# Patient Record
Sex: MALE | Race: WHITE | Employment: FULL TIME | URBAN - METROPOLITAN AREA
[De-identification: names, ages, dates, MRNs, and addresses within clinical notes are randomized per-mention and may not be internally consistent; named-entity substitution may affect disease eponyms.]

---

## 2017-01-31 DIAGNOSIS — I10 ESSENTIAL HYPERTENSION, BENIGN: Primary | ICD-10-CM

## 2017-01-31 RX ORDER — LISINOPRIL AND HYDROCHLOROTHIAZIDE 12.5; 2 MG/1; MG/1
1 TABLET ORAL DAILY
Qty: 14 TABLET | Refills: 0 | COMMUNITY
Start: 2017-01-31 | End: 2017-02-13

## 2017-01-31 NOTE — TELEPHONE ENCOUNTER
Patient called to request a 2 week supply of his medication to get him through until his appointment with Dr. Zhu on 02/13/17.    Called med to pharmacy:  Signed Prescriptions:                        Disp   Refills    lisinopril-hydrochlorothiazide (PRINZIDE/Z*14 tab*0        Sig: Take 1 tablet by mouth daily  Authorizing Provider: MINESH ZHU  Ordering User: CATRACHO NOE    Called and informed patient    Catracho Noe CMA

## 2017-02-13 ENCOUNTER — OFFICE VISIT (OUTPATIENT)
Dept: FAMILY MEDICINE | Facility: CLINIC | Age: 47
End: 2017-02-13

## 2017-02-13 VITALS
BODY MASS INDEX: 35.28 KG/M2 | HEART RATE: 88 BPM | OXYGEN SATURATION: 98 % | SYSTOLIC BLOOD PRESSURE: 150 MMHG | WEIGHT: 242.4 LBS | TEMPERATURE: 98.7 F | DIASTOLIC BLOOD PRESSURE: 76 MMHG

## 2017-02-13 DIAGNOSIS — E66.09 NON MORBID OBESITY DUE TO EXCESS CALORIES: ICD-10-CM

## 2017-02-13 DIAGNOSIS — I10 ESSENTIAL HYPERTENSION, BENIGN: Primary | ICD-10-CM

## 2017-02-13 DIAGNOSIS — E78.00 PURE HYPERCHOLESTEROLEMIA: ICD-10-CM

## 2017-02-13 DIAGNOSIS — I10 ESSENTIAL HYPERTENSION, BENIGN: ICD-10-CM

## 2017-02-13 PROCEDURE — 36415 COLL VENOUS BLD VENIPUNCTURE: CPT | Performed by: FAMILY MEDICINE

## 2017-02-13 PROCEDURE — 80061 LIPID PANEL: CPT | Mod: 90 | Performed by: FAMILY MEDICINE

## 2017-02-13 PROCEDURE — 99213 OFFICE O/P EST LOW 20 MIN: CPT | Performed by: FAMILY MEDICINE

## 2017-02-13 PROCEDURE — 80048 BASIC METABOLIC PNL TOTAL CA: CPT | Mod: 90 | Performed by: FAMILY MEDICINE

## 2017-02-13 RX ORDER — LISINOPRIL AND HYDROCHLOROTHIAZIDE 12.5; 2 MG/1; MG/1
1 TABLET ORAL DAILY
Qty: 90 TABLET | Refills: 1 | Status: SHIPPED | OUTPATIENT
Start: 2017-02-13 | End: 2018-05-07

## 2017-02-13 NOTE — NURSING NOTE
Rohan is here for a medication recheck.     Pre-Visit Screening :  Immunizations : up to date  Colon Screening : na  Asthma Action Test/Plan : na  PHQ9/GAD7 :  Na    BP done on the right arm, with a large sized cuff.  Pulse - regular  My Chart - accepts    CLASSIFICATION OF OVERWEIGHT AND OBESITY BY BMI                         Obesity Class           BMI(kg/m2)  Underweight                                    < 18.5  Normal                                         18.5-24.9  Overweight                                     25.0-29.9  OBESITY                     I                  30.0-34.9                              II                 35.0-39.9  EXTREME OBESITY             III                >40                             Patient's  BMI Body mass index is 35.28 kg/(m^2).  http://hin.nhlbi.nih.gov/menuplanner/menu.cgi  Questioned patient about current smoking habits.  Pt. has never smoked.  Dorothy.ERNST Azar ([facility])

## 2017-02-13 NOTE — MR AVS SNAPSHOT
After Visit Summary   2/13/2017    Rohan Ventura    MRN: [id number]           Patient Information     Date Of Birth          1970        Visit Information        Provider Department      2/13/2017 5:30 PM Judy Cantu MD [facility]        Today's Diagnoses     Essential hypertension, benign    -  1    Pure hypercholesterolemia        Non morbid obesity due to excess calories        BENIGN HYPERTENSION          Care Instructions    Check two blood pressures a week-   After six are recorded, call them to me [phone]    Helpful site for healthy eating: www.GenePeeks.gov    -enjoy food but eat less  -avoid oversize portions (avoid buffets)  -make half your plate fruits and vegetables  -switch to fat-free or low fat milk  -make at least one half of your grains, whole grains  -drink water instead of sweet drinks  -compare sodium (salt) in foods like soup, bread and frozen meals and choose foods with lower numbers    Pack a lunch    Recheck blood pressure in six months (eat breakfast, skip lunch)    Take baby aspirin daily            Follow-ups after your visit        Who to contact     If you have questions or need follow up information about today's clinic visit or your schedule please contact [facility] directly at [phone].  Normal or non-critical lab and imaging results will be communicated to you by MyChart, letter or phone within 4 business days after the clinic has received the results. If you do not hear from us within 7 days, please contact the clinic through Azimahart or phone. If you have a critical or abnormal lab result, we will notify you by phone as soon as possible.  Submit refill requests through DataParenting or call your pharmacy and they will forward the refill request to us. Please allow 3 business days for your refill to be completed.          Additional Information About Your Visit        MyChart Information      Seen gives you secure access to your electronic health record. If you see a primary care provider, you can also send messages to your care team and make appointments. If you have questions, please call your primary care clinic.  If you do not have a primary care provider, please call [phone] and they will assist you.        Care EveryWhere ID     This is your Care EveryWhere ID. This could be used by other organizations to access your [place] medical records  [id number]        Your Vitals Were     Pulse Temperature Pulse Oximetry BMI (Body Mass Index)          88 98.7  F (37.1  C) (Oral) 98% 35.28 kg/m2         Blood Pressure from Last 3 Encounters:   02/13/17 150/76   02/08/16 132/80   03/11/15 158/90    Weight from Last 3 Encounters:   02/13/17 110 kg (242 lb 6.4 oz)   02/08/16 108.7 kg (239 lb 9.6 oz)   03/11/15 108.1 kg (238 lb 6.4 oz)              We Performed the Following     BASIC METABOLIC PANEL (QUEST)     Lipid Profile (QUEST)     VENOUS COLLECTION          Where to get your medicines      These medications were sent to [facility]/pharmacy #[id number] - [place] - [address]  [address] [place] [address]     Phone:  [phone]     lisinopril-hydrochlorothiazide 20-12.5 MG per tablet          Primary Care Provider Office Phone # Fax #    Judy Cantu [phone]       [facility] [address]  [facility] [address]        Thank you!     Thank you for choosing [facility]  for your care. Our goal is always to provide you with excellent care. Hearing back from our patients is one way we can continue to improve our services. Please take a few minutes to complete the written survey that you may receive in the mail after your visit with us. Thank you!             Your Updated Medication List - Protect others around you: Learn how to safely use, store and throw away your medicines at www.disposemymeds.org.           This list is accurate as of: 2/13/17  6:32 PM.  Always use your most recent med list.                   Brand Name Dispense Instructions for use    aspirin 81 MG tablet      Take by mouth daily       lisinopril-hydrochlorothiazide 20-12.5 MG per tablet    PRINZIDE/ZESTORETIC    90 tablet    Take 1 tablet by mouth daily       Multi-vitamin Tabs tablet      Take 1 tablet by mouth daily       simvastatin 20 MG tablet    ZOCOR    90 tablet    Take 1 tablet (20 mg) by mouth At Bedtime

## 2017-02-14 NOTE — PATIENT INSTRUCTIONS
Check two blood pressures a week-   After six are recorded, call them to me [phone]    Helpful site for healthy eating: www.choosemyplate.gov    -enjoy food but eat less  -avoid oversize portions (avoid buffets)  -make half your plate fruits and vegetables  -switch to fat-free or low fat milk  -make at least one half of your grains, whole grains  -drink water instead of sweet drinks  -compare sodium (salt) in foods like soup, bread and frozen meals and choose foods with lower numbers    Pack a lunch    Recheck blood pressure in six months (eat breakfast, skip lunch)    Take baby aspirin daily

## 2017-02-14 NOTE — PROGRESS NOTES
SUBJECTIVE:                                                    Rohan Ventura is a 47 year old male who presents to clinic today for the following health issues:      Hyperlipidemia Follow-Up      Rate your low fat/cholesterol diet?: fair- fast food lunches- tries to make good choices such as subway    Taking statin?  Yes, no muscle aches from statin    Other lipid medications/supplements?:  none     Hypertension Follow-up      Outpatient blood pressures are being checked      Low Salt Diet: not monitoring salt       Amount of exercise or physical activity: trying to be more active- life is scheduled- work and child's activities    Problems taking medications regularly: No    Medication side effects: none  Diet: see above    PROBLEMS TO ADD ON...    Problem list and histories reviewed & adjusted, as indicated.  Additional history: creeping weight gain- despite eating similar diet for years    Patient Active Problem List   Diagnosis     Obesity     Labyrinthitis     Essential hypertension, benign     Pure hypercholesterolemia     ACP (advance care planning)     Abnormal glucose     Health Care Home     Past Surgical History   Procedure Laterality Date     C nonspecific procedure       LUNG ABSCESS SURGERY-empyema ; dr Mendes       Social History   Substance Use Topics     Smoking status: Former Smoker     Quit date: 1994     Smokeless tobacco: Never Used     Alcohol use 0.0 oz/week     0 drink(s) per week     Family History   Problem Relation Age of Onset     C.A.D. Father       age 58     Hypertension Father      Hypertension Mother      DIABETES Mother      Lipids Mother      OSTEOPOROSIS Mother       age 80 after hip fracture     Hypertension Maternal Grandfather      GASTROINTESTINAL DISEASE Sister      Crohns     GASTROINTESTINAL DISEASE Sister      Crohns         Current Outpatient Prescriptions   Medication Sig Dispense Refill     lisinopril-hydrochlorothiazide (PRINZIDE/ZESTORETIC)  20-12.5 MG per tablet Take 1 tablet by mouth daily 90 tablet 1     simvastatin (ZOCOR) 20 MG tablet Take 1 tablet (20 mg) by mouth At Bedtime 90 tablet 1     multivitamin, therapeutic with minerals (MULTI-VITAMIN) TABS Take 1 tablet by mouth daily       aspirin 81 MG tablet Take by mouth daily         ROS:  Constitutional, HEENT, cardiovascular, pulmonary, gi and gu systems are negative, except as otherwise noted.    OBJECTIVE:                                                    /76 (BP Location: Right arm)  Pulse 88  Temp 98.7  F (37.1  C) (Oral)  Wt 110 kg (242 lb 6.4 oz)  SpO2 98%  BMI 35.28 kg/m2  Body mass index is 35.28 kg/(m^2).  Regular rate and  rhythm. S1 and S2 normal, no murmurs, clicks, gallops or rubs. No edema or JVD. Chest is clear; no wheezes or rales.      Diagnostic Test Results:  Results for orders placed or performed in visit on 02/13/17   Lipid Profile (Klick2Contact)   Result Value Ref Range    Cholesterol 151 125 - 200 mg/dL    HDL Cholesterol 34 (L) > OR = 40 mg/dL    Triglycerides 127 <150 mg/dL    LDL Cholesterol Calculated 92 <130 mg/dL (calc)    Cholesterol/HDL Ratio 4.4 < OR = 5.0 (calc)    Non HDL Cholesterol 117 mg/dL (calc)   BASIC METABOLIC PANEL (QUEST)   Result Value Ref Range    Glucose 89 65 - 99 mg/dL    Urea Nitrogen 11 7 - 25 mg/dL    Creatinine 0.99 0.60 - 1.35 mg/dL    GFR Estimate 89 > OR = 60 mL/min/1.73m2    EGFR African American 103 > OR = 60 mL/min/1.73m2    BUN/Creatinine Ratio NOT APPLICABLE 6 - 22 (calc)    Sodium 137 135 - 146 mmol/L    Potassium 4.2 3.5 - 5.3 mmol/L    Chloride 100 98 - 110 mmol/L    Carbon Dioxide 26 20 - 31 mmol/L    Calcium 9.7 8.6 - 10.3 mg/dL        ASSESSMENT/PLAN:                                                      Problem List Items Addressed This Visit     Essential hypertension, benign - Primary    Relevant Medications    lisinopril-hydrochlorothiazide (PRINZIDE/ZESTORETIC) 20-12.5 MG per tablet    Other Relevant Orders    Lipid Profile  "(QUEST) (Completed)    BASIC METABOLIC PANEL (QUEST) (Completed)    VENOUS COLLECTION (Completed)    Obesity    Pure hypercholesterolemia    Relevant Orders    Lipid Profile (QUEST) (Completed)    VENOUS COLLECTION (Completed)           BMI:   Estimated body mass index is 35.28 kg/(m^2) as calculated from the following:    Height as of 2/8/16: 1.765 m (5' 9.5\").    Weight as of this encounter: 110 kg (242 lb 6.4 oz).   Weight management plan: Discussed healthy diet and exercise guidelines and patient will follow up in 6 months in clinic to re-evaluate.      MEDICATIONS:  Continue current medications without change  FUTURE APPOINTMENTS:       - Follow-up visit in 6 months- call me with six blood pressures over the next two weeks  Work on weight loss  Regular exercise    Judy Cantu MD  [facility]  White coat hypertension- has a home cuff, will check  "

## 2017-02-17 LAB
BUN SERPL-MCNC: 11 MG/DL (ref 7–25)
BUN/CREATININE RATIO: NORMAL (CALC) (ref 6–22)
CALCIUM SERPL-MCNC: 9.7 MG/DL (ref 8.6–10.3)
CHLORIDE SERPLBLD-SCNC: 100 MMOL/L (ref 98–110)
CHOLEST SERPL-MCNC: 151 MG/DL (ref 125–200)
CHOLEST/HDLC SERPL: 4.4 (CALC)
CO2 SERPL-SCNC: 26 MMOL/L (ref 20–31)
CREAT SERPL-MCNC: 0.99 MG/DL (ref 0.6–1.35)
EGFR AFRICAN AMERICAN - QUEST: 103 ML/MIN/1.73M2
GFR SERPL CREATININE-BSD FRML MDRD: 89 ML/MIN/1.73M2
GLUCOSE - QUEST: 89 MG/DL (ref 65–99)
HDLC SERPL-MCNC: 34 MG/DL
LDLC SERPL CALC-MCNC: 92 MG/DL (CALC)
NONHDLC SERPL-MCNC: 117 MG/DL (CALC)
POTASSIUM SERPL-SCNC: 4.2 MMOL/L (ref 3.5–5.3)
SODIUM SERPL-SCNC: 137 MMOL/L (ref 135–146)
TRIGL SERPL-MCNC: 127 MG/DL

## 2017-03-06 DIAGNOSIS — E78.00 PURE HYPERCHOLESTEROLEMIA: ICD-10-CM

## 2017-03-06 RX ORDER — SIMVASTATIN 20 MG
20 TABLET ORAL AT BEDTIME
Qty: 90 TABLET | Refills: 3 | Status: SHIPPED | OUTPATIENT
Start: 2017-03-06 | End: 2018-04-04

## 2018-04-04 ENCOUNTER — TELEPHONE (OUTPATIENT)
Dept: FAMILY MEDICINE | Facility: CLINIC | Age: 48
End: 2018-04-04

## 2018-04-04 DIAGNOSIS — E78.00 PURE HYPERCHOLESTEROLEMIA: ICD-10-CM

## 2018-04-04 RX ORDER — SIMVASTATIN 20 MG
20 TABLET ORAL AT BEDTIME
Qty: 30 TABLET | Refills: 0 | COMMUNITY
Start: 2018-04-04 | End: 2020-01-08

## 2018-04-04 NOTE — TELEPHONE ENCOUNTER
Ok refill of simvastatin for one month only called into [facility]. Pt needs fasting OV for further refills.     Thanks,Skylar  [phone] (home)

## 2018-05-07 ENCOUNTER — OFFICE VISIT (OUTPATIENT)
Dept: FAMILY MEDICINE | Facility: CLINIC | Age: 48
End: 2018-05-07

## 2018-05-07 VITALS
BODY MASS INDEX: 34.93 KG/M2 | HEART RATE: 95 BPM | SYSTOLIC BLOOD PRESSURE: 138 MMHG | OXYGEN SATURATION: 97 % | TEMPERATURE: 98.4 F | DIASTOLIC BLOOD PRESSURE: 82 MMHG | WEIGHT: 240 LBS

## 2018-05-07 DIAGNOSIS — Z12.5 SCREENING FOR PROSTATE CANCER: ICD-10-CM

## 2018-05-07 DIAGNOSIS — Z12.11 SCREEN FOR COLON CANCER: ICD-10-CM

## 2018-05-07 DIAGNOSIS — I10 ESSENTIAL HYPERTENSION, BENIGN: Primary | ICD-10-CM

## 2018-05-07 DIAGNOSIS — E78.00 PURE HYPERCHOLESTEROLEMIA: ICD-10-CM

## 2018-05-07 PROCEDURE — 84153 ASSAY OF PSA TOTAL: CPT | Mod: 90 | Performed by: FAMILY MEDICINE

## 2018-05-07 PROCEDURE — 80048 BASIC METABOLIC PNL TOTAL CA: CPT | Mod: 90 | Performed by: FAMILY MEDICINE

## 2018-05-07 PROCEDURE — 36415 COLL VENOUS BLD VENIPUNCTURE: CPT | Performed by: FAMILY MEDICINE

## 2018-05-07 PROCEDURE — 80061 LIPID PANEL: CPT | Mod: 90 | Performed by: FAMILY MEDICINE

## 2018-05-07 PROCEDURE — 99213 OFFICE O/P EST LOW 20 MIN: CPT | Performed by: FAMILY MEDICINE

## 2018-05-07 RX ORDER — SIMVASTATIN 20 MG
20 TABLET ORAL AT BEDTIME
Qty: 90 TABLET | Refills: 3 | Status: CANCELLED | OUTPATIENT
Start: 2018-05-07

## 2018-05-07 NOTE — PROGRESS NOTES
SUBJECTIVE:   Rohan Ventura is a 48 year old male who presents to clinic today for the following health issues:    Hypertension Follow-up      Outpatient blood pressures are not being checked.    Low Salt Diet: no added salt      Amount of exercise or physical activity:Physical Job- carries tool belt- walking    Problems taking medications regularly: No    Medication side effects: none    Diet: low salt    Problem list and histories reviewed & adjusted, as indicated.  Additional history: as documented    Patient Active Problem List   Diagnosis     Obesity     Labyrinthitis     Essential hypertension, benign     Pure hypercholesterolemia     ACP (advance care planning)     Abnormal glucose     Health Care Home     Past Surgical History:   Procedure Laterality Date     C NONSPECIFIC PROCEDURE      LUNG ABSCESS SURGERY-empyema ; dr Mendes       Social History   Substance Use Topics     Smoking status: Former Smoker     Quit date: 1994     Smokeless tobacco: Never Used     Alcohol use 0.0 oz/week     0 drink(s) per week     Family History   Problem Relation Age of Onset     C.A.D. Father       age 58     Hypertension Father      Hypertension Mother      DIABETES Mother      Lipids Mother      OSTEOPOROSIS Mother       age 80 after hip fracture     Hypertension Maternal Grandfather      GASTROINTESTINAL DISEASE Sister      Crohns     GASTROINTESTINAL DISEASE Sister      Crohns         Current Outpatient Prescriptions   Medication Sig Dispense Refill     aspirin 81 MG tablet Take by mouth daily       lisinopril-hydrochlorothiazide (PRINZIDE/ZESTORETIC) 20-12.5 MG per tablet Take 1 tablet by mouth daily 90 tablet 1     multivitamin, therapeutic with minerals (MULTI-VITAMIN) TABS Take 1 tablet by mouth daily       simvastatin (ZOCOR) 20 MG tablet Take 1 tablet (20 mg) by mouth At Bedtime 30 tablet 0       Heart problems in family- father MI at age 58 (uncles also with heart disease)  No family history  "of cancer  Mother had type 2 diabetis    Reviewed and updated as needed this visit by clinical staff       Reviewed and updated as needed this visit by Provider         ROS:  Review of Systems   Constitution: Negative for decreased appetite, fever, malaise/fatigue, weight gain and weight loss.   HENT: Negative for congestion and hoarse voice.    Cardiovascular: Negative for chest pain, claudication, dyspnea on exertion, irregular heartbeat, orthopnea, palpitations and syncope.   Respiratory: Negative for cough and shortness of breath.    Skin: Negative for rash.   Musculoskeletal: Negative for joint pain and myalgias.       OBJECTIVE:     /82 (BP Location: Left arm, Patient Position: Sitting, Cuff Size: Adult Large)  Pulse 95  Temp 98.4  F (36.9  C) (Oral)  Wt 108.9 kg (240 lb)  SpO2 97%  BMI 34.93 kg/m2  Body mass index is 34.93 kg/(m^2).   Regular rate and  rhythm. S1 and S2 normal, no murmurs, clicks, gallops or rubs. No edema or JVD. Chest is clear; no wheezes or rales.      Diagnostic Test Results:  No results found for this or any previous visit (from the past 24 hour(s)).    ASSESSMENT/PLAN:     Problem List Items Addressed This Visit     Essential hypertension, benign - Primary    Relevant Orders    Lipid Profile    BASIC METABOLIC PANEL (QUEST)    VENOUS COLLECTION (Completed)    Pure hypercholesterolemia    Relevant Orders    HCL PSA, SCREENING (QUEST)      Other Visit Diagnoses     Screen for colon cancer        Relevant Orders    GASTROENTEROLOGY ADULT REF PROCEDURE ONLY Other; [facility] [phone]    Screening for prostate cancer               BMI:   Estimated body mass index is 34.93 kg/(m^2) as calculated from the following:    Height as of 2/8/16: 1.765 m (5' 9.5\").    Weight as of this encounter: 108.9 kg (240 lb).   Weight management plan: Discussed healthy diet and exercise guidelines and patient will follow up in 6 months in clinic to re-evaluate.      MEDICATIONS:  Continue current " medications without change  FUTURE APPOINTMENTS:       - Follow-up visit - patient plans to schedule a physical  Health Care Maintenance discussed this visit  Work on weight loss  Regular exercise    Judy Cantu MD  [facility]

## 2018-05-07 NOTE — MR AVS SNAPSHOT
After Visit Summary   5/7/2018    Rohan Ventura    MRN: [id number]           Patient Information     Date Of Birth          1970        Visit Information        Provider Department      5/7/2018 5:30 PM Judy Cantu MD [facility]        Today's Diagnoses     Essential hypertension, benign    -  1    Screen for colon cancer        Pure hypercholesterolemia        Screening for prostate cancer           Follow-ups after your visit        Additional Services     GASTROENTEROLOGY ADULT REF PROCEDURE ONLY Other; [facility] [phone]       Last Lab Result: Creatinine (mg/dL)       Date                     Value                 02/13/2017               0.99             ----------  There is no height or weight on file to calculate BMI.     Needed:  No  Language:  English    Patient will be contacted to schedule procedure.     Please be aware that coverage of these services is subject to the terms and limitations of your health insurance plan.  Call member services at your health plan with any benefit or coverage questions.  Any procedures must be performed at a [facility] facility OR coordinated by your clinic's referral office.    Please bring the following with you to your appointment:    (1) Any X-Rays, CTs or MRIs which have been performed.  Contact the facility where they were done to arrange for  prior to your scheduled appointment.    (2) List of current medications   (3) This referral request   (4) Any documents/labs given to you for this referral                  Who to contact     If you have questions or need follow up information about today's clinic visit or your schedule please contact [facility] directly at [phone].  Normal or non-critical lab and imaging results will be communicated to you by MyChart, letter or phone within 4 business days after the clinic has received the results. If you do not hear from us  within 7 days, please contact the clinic through Chirply or phone. If you have a critical or abnormal lab result, we will notify you by phone as soon as possible.  Submit refill requests through Chirply or call your pharmacy and they will forward the refill request to us. Please allow 3 business days for your refill to be completed.          Additional Information About Your Visit        SportsBeephariBuildApp Information     Chirply gives you secure access to your electronic health record. If you see a primary care provider, you can also send messages to your care team and make appointments. If you have questions, please call your primary care clinic.  If you do not have a primary care provider, please call [phone] and they will assist you.        Care EveryWhere ID     This is your Care EveryWhere ID. This could be used by other organizations to access your [place] medical records  [id number]        Your Vitals Were     Pulse Temperature Pulse Oximetry BMI (Body Mass Index)          95 98.4  F (36.9  C) (Oral) 97% 34.93 kg/m2         Blood Pressure from Last 3 Encounters:   05/07/18 138/82   02/13/17 150/76   02/08/16 132/80    Weight from Last 3 Encounters:   05/07/18 108.9 kg (240 lb)   02/13/17 110 kg (242 lb 6.4 oz)   02/08/16 108.7 kg (239 lb 9.6 oz)              We Performed the Following     BASIC METABOLIC PANEL (QUEST)     GASTROENTEROLOGY ADULT REF PROCEDURE ONLY Other; [facility] [phone]     HCL PSA, SCREENING (QUEST)     Lipid Profile     VENOUS COLLECTION          Today's Medication Changes          These changes are accurate as of 5/7/18 11:59 PM.  If you have any questions, ask your nurse or doctor.               Start taking these medicines.        Dose/Directions    atorvastatin 20 MG tablet   Commonly known as:  LIPITOR   Used for:  Pure hypercholesterolemia   Started by:  Judy Cantu MD        Dose:  20 mg   Take 1 tablet (20 mg) by mouth daily   Quantity:  90 tablet   Refills:  0             Where to get your medicines      These medications were sent to [facility]/pharmacy #[id number] - [place], [place] - [address]  [address] [place] [address]     Phone:  [phone]     atorvastatin 20 MG tablet    lisinopril-hydrochlorothiazide 20-12.5 MG per tablet                Primary Care Provider Office Phone # Fax #    Judy Cantu [phone] [address] [facility] [address]  [facility] [address]        Equal Access to Services     [facility]: Hadii aad ku hadasho Soomaali, waaxda luqadaha, qaybta kaalmada adeegyada, waxay idiin hayindy adeeg khmina cooney . So [facility] [place] [phone].    ATENCIÓN: Si habla español, tiene a gaffney disposición servicios gratuitos de asistencia lingüística. [facility] [phone].    We comply with applicable federal civil rights laws and [place] laws. We do not discriminate on the basis of race, color, national origin, age, disability, sex, sexual orientation, or gender identity.            Thank you!     Thank you for choosing [facility]  for your care. Our goal is always to provide you with excellent care. Hearing back from our patients is one way we can continue to improve our services. Please take a few minutes to complete the written survey that you may receive in the mail after your visit with us. Thank you!             Your Updated Medication List - Protect others around you: Learn how to safely use, store and throw away your medicines at www.disposemymeds.org.          This list is accurate as of 5/7/18 11:59 PM.  Always use your most recent med list.                   Brand Name Dispense Instructions for use Diagnosis    aspirin 81 MG tablet      Take by mouth daily        atorvastatin 20 MG tablet    LIPITOR    90 tablet    Take 1 tablet (20 mg) by mouth daily    Pure hypercholesterolemia       lisinopril-hydrochlorothiazide 20-12.5 MG per tablet    PRINZIDE/ZESTORETIC    90 tablet    Take 1 tablet by mouth daily     Essential hypertension, benign       Multi-vitamin Tabs tablet      Take 1 tablet by mouth daily        simvastatin 20 MG tablet    ZOCOR    30 tablet    Take 1 tablet (20 mg) by mouth At Bedtime    Pure hypercholesterolemia

## 2018-05-07 NOTE — NURSING NOTE
Maurice is here for medication check and refill today.    Pre-visit Screening:  Immunizations:  up to date  Colonoscopy:  is due and ordered today  Mammogram: NA  Asthma Action Test/Plan:  No concerns   PHQ9:  No concerns  GAD7:  No concerns  Questioned patient about current smoking habits Pt. quit smoking some time ago.  Ok to leave detailed message on voice mail for today's visit only Yes, phone # [phone]   (cell)

## 2018-05-08 ASSESSMENT — ENCOUNTER SYMPTOMS
DYSPNEA ON EXERTION: 0
CLAUDICATION: 0
ORTHOPNEA: 0
PALPITATIONS: 0
SHORTNESS OF BREATH: 0
MYALGIAS: 0
FEVER: 0
SYNCOPE: 0
DECREASED APPETITE: 0
WEIGHT LOSS: 0
WEIGHT GAIN: 0
COUGH: 0
IRREGULAR HEARTBEAT: 0
HOARSE VOICE: 0

## 2018-05-09 LAB
ABBOTT PSA - QUEST: 0.5 NG/ML
BUN SERPL-MCNC: 14 MG/DL (ref 7–25)
BUN/CREATININE RATIO: NORMAL (CALC) (ref 6–22)
CALCIUM SERPL-MCNC: 9.4 MG/DL (ref 8.6–10.3)
CHLORIDE SERPLBLD-SCNC: 100 MMOL/L (ref 98–110)
CHOLEST SERPL-MCNC: 179 MG/DL
CHOLEST/HDLC SERPL: 4.8 (CALC)
CO2 SERPL-SCNC: 24 MMOL/L (ref 20–31)
CREAT SERPL-MCNC: 1.08 MG/DL (ref 0.7–1.33)
EGFR AFRICAN AMERICAN - QUEST: 92 ML/MIN/1.73M2
GFR SERPL CREATININE-BSD FRML MDRD: 80 ML/MIN/1.73M2
GLUCOSE - QUEST: 88 MG/DL (ref 65–99)
HDLC SERPL-MCNC: 37 MG/DL
LDLC SERPL CALC-MCNC: 119 MG/DL (CALC)
NONHDLC SERPL-MCNC: 142 MG/DL (CALC)
POTASSIUM SERPL-SCNC: 4.1 MMOL/L (ref 3.5–5.3)
SODIUM SERPL-SCNC: 136 MMOL/L (ref 135–146)
TRIGL SERPL-MCNC: 121 MG/DL

## 2018-05-10 RX ORDER — LISINOPRIL AND HYDROCHLOROTHIAZIDE 12.5; 2 MG/1; MG/1
1 TABLET ORAL DAILY
Qty: 90 TABLET | Refills: 3 | Status: SHIPPED | OUTPATIENT
Start: 2018-05-10 | End: 2019-01-18

## 2018-05-10 RX ORDER — ATORVASTATIN CALCIUM 20 MG/1
20 TABLET, FILM COATED ORAL DAILY
Qty: 90 TABLET | Refills: 0 | Status: SHIPPED | OUTPATIENT
Start: 2018-05-10 | End: 2018-08-07

## 2018-05-11 DIAGNOSIS — E78.00 PURE HYPERCHOLESTEROLEMIA: ICD-10-CM

## 2018-05-11 RX ORDER — SIMVASTATIN 20 MG
20 TABLET ORAL AT BEDTIME
Qty: 90 TABLET | Refills: 1 | OUTPATIENT
Start: 2018-05-11

## 2018-05-11 NOTE — TELEPHONE ENCOUNTER
RX changed to atorvastatin-   ?? Did he see his result note    Want to see him back in the office in three months after a trial of new statin

## 2018-05-11 NOTE — TELEPHONE ENCOUNTER
Refill request came in for   Pending Prescriptions:                       Disp   Refills    simvastatin (ZOCOR) 20 MG tablet          90 tab*1            Sig: Take 1 tablet (20 mg) by mouth At Bedtime    Patient was seen on 5/7/18 for medication check and was told to follow up in 6 months. Routing to Dr. Cantu for review and approval or denial.

## 2018-05-11 NOTE — TELEPHONE ENCOUNTER
Pharmacy was contacted and they stated they are now aware and will not send the refill request through anymore. Closing encounter due to no further action needed.

## 2018-07-27 ENCOUNTER — TRANSFERRED RECORDS (OUTPATIENT)
Dept: FAMILY MEDICINE | Facility: CLINIC | Age: 48
End: 2018-07-27

## 2018-08-07 DIAGNOSIS — E78.00 PURE HYPERCHOLESTEROLEMIA: ICD-10-CM

## 2018-08-07 RX ORDER — ATORVASTATIN CALCIUM 20 MG/1
TABLET, FILM COATED ORAL
Qty: 30 TABLET | Refills: 0 | Status: SHIPPED | OUTPATIENT
Start: 2018-08-07 | End: 2019-01-07

## 2018-08-07 NOTE — TELEPHONE ENCOUNTER
Received incoming refill request for   Pending Prescriptions:                       Disp   Refills    atorvastatin (LIPITOR) 20 MG tablet [Phar*90 tab*0            Sig: TAKE 1 TABLET BY MOUTH EVERY DAY    Patient was last seen in clinic to review cholesterol on 5/7/18 and last refill of this medication was given on 5/10/18 for a 90 day supply. Patient was told to follow up at visit in 6 months so he is up to date with office visits. Routing to Dr. Cantu for approval or denial of refill.

## 2018-08-07 NOTE — TELEPHONE ENCOUNTER
Patient is due for fasting labs (skip lunch and due after work- 8 hours fasting) or early morning appointment  Please call patient to schedule

## 2019-01-07 DIAGNOSIS — I10 ESSENTIAL HYPERTENSION, BENIGN: ICD-10-CM

## 2019-01-07 DIAGNOSIS — E78.00 PURE HYPERCHOLESTEROLEMIA: ICD-10-CM

## 2019-01-07 RX ORDER — ATORVASTATIN CALCIUM 20 MG/1
20 TABLET, FILM COATED ORAL DAILY
Qty: 20 TABLET | Refills: 0 | COMMUNITY
Start: 2019-01-07 | End: 2019-01-18

## 2019-01-07 NOTE — TELEPHONE ENCOUNTER
Patient called in and stated that he has an upcoming appointment scheduled with Dr. Cantu on 1/18/19 and is going to run out of his statin medication before then. He is requesting to get a short term supply that will last him until this appointment so that he does not have to go without taking the medication. I called in a 20 day supply of the medication so the patient has enough to get him through and called him to inform him of this.

## 2019-01-18 ENCOUNTER — OFFICE VISIT (OUTPATIENT)
Dept: FAMILY MEDICINE | Facility: CLINIC | Age: 49
End: 2019-01-18

## 2019-01-18 VITALS
TEMPERATURE: 98.1 F | SYSTOLIC BLOOD PRESSURE: 130 MMHG | WEIGHT: 240.4 LBS | HEIGHT: 70 IN | BODY MASS INDEX: 34.41 KG/M2 | DIASTOLIC BLOOD PRESSURE: 86 MMHG | HEART RATE: 111 BPM | OXYGEN SATURATION: 97 %

## 2019-01-18 DIAGNOSIS — L82.1 SEBORRHEIC KERATOSIS: ICD-10-CM

## 2019-01-18 DIAGNOSIS — I10 ESSENTIAL HYPERTENSION, BENIGN: ICD-10-CM

## 2019-01-18 DIAGNOSIS — Z23 NEED FOR VACCINATION: ICD-10-CM

## 2019-01-18 DIAGNOSIS — E66.09 CLASS 1 OBESITY DUE TO EXCESS CALORIES WITHOUT SERIOUS COMORBIDITY WITH BODY MASS INDEX (BMI) OF 34.0 TO 34.9 IN ADULT: ICD-10-CM

## 2019-01-18 DIAGNOSIS — E66.811 CLASS 1 OBESITY DUE TO EXCESS CALORIES WITHOUT SERIOUS COMORBIDITY WITH BODY MASS INDEX (BMI) OF 34.0 TO 34.9 IN ADULT: ICD-10-CM

## 2019-01-18 DIAGNOSIS — E78.00 PURE HYPERCHOLESTEROLEMIA: ICD-10-CM

## 2019-01-18 PROCEDURE — 99214 OFFICE O/P EST MOD 30 MIN: CPT | Mod: 25 | Performed by: FAMILY MEDICINE

## 2019-01-18 PROCEDURE — 90472 IMMUNIZATION ADMIN EACH ADD: CPT | Performed by: FAMILY MEDICINE

## 2019-01-18 PROCEDURE — 90471 IMMUNIZATION ADMIN: CPT | Performed by: FAMILY MEDICINE

## 2019-01-18 PROCEDURE — 90632 HEPA VACCINE ADULT IM: CPT | Performed by: FAMILY MEDICINE

## 2019-01-18 PROCEDURE — 90686 IIV4 VACC NO PRSV 0.5 ML IM: CPT | Performed by: FAMILY MEDICINE

## 2019-01-18 PROCEDURE — 36415 COLL VENOUS BLD VENIPUNCTURE: CPT | Performed by: FAMILY MEDICINE

## 2019-01-18 RX ORDER — LISINOPRIL AND HYDROCHLOROTHIAZIDE 12.5; 2 MG/1; MG/1
1 TABLET ORAL DAILY
Qty: 90 TABLET | Refills: 3 | Status: SHIPPED | OUTPATIENT
Start: 2019-01-18 | End: 2020-05-06

## 2019-01-18 RX ORDER — ATORVASTATIN CALCIUM 20 MG/1
20 TABLET, FILM COATED ORAL DAILY
Qty: 90 TABLET | Refills: 3 | Status: SHIPPED | OUTPATIENT
Start: 2019-01-18 | End: 2020-05-06

## 2019-01-18 ASSESSMENT — MIFFLIN-ST. JEOR: SCORE: 1958.29

## 2019-01-18 NOTE — NURSING NOTE
Rohan is here for med check fasting    Pre-visit Screening:  Immunizations:  up to date  Colonoscopy:  is up to date  Mammogram: NA  Asthma Action Test/Plan:  NA  PHQ9:  None  GAD7:  None  Questioned patient about current smoking habits Pt. quit smoking some time ago.  Ok to leave detailed message on voice mail for today's visit only Yes, phone # [phone]

## 2019-01-18 NOTE — PROGRESS NOTES
SUBJECTIVE:   Rohan Ventura is a 48 year old male who presents to clinic today for the following health issues:    Hyperlipidemia Follow-Up      Rate your low fat/cholesterol diet?: good- avoids fast food- processed food    Taking statin?  Yes, no muscle aches from statin    Pain in the left side of neck- no radicular pain    Other lipid medications/supplements?:  none    Other concerns: wants a mole checked on his  right posterior shoulder- this is a seborhea keratosis  Multiple moles- on his back    In -   Not sure about what immunizations he got at the time  Going [place] an all inclusive resort in [place] next week, Hep A advised    Hypertension Follow-up      Outpatient blood pressures are not being checked.    Low Salt Diet: no added salt      Amount of exercise or physical activity: no exercise outside of work    Problems taking medications regularly: No    Medication side effects: his wife thinks that he is more irritable- wondering if that could be a side effect of his blood pressure medications       Problem list and histories reviewed & adjusted, as indicated.  Additional history: as documented    Patient Active Problem List   Diagnosis     Obesity     Labyrinthitis     Essential hypertension, benign     Pure hypercholesterolemia     ACP (advance care planning)     Abnormal glucose     Health Care Home     Past Surgical History:   Procedure Laterality Date     C NONSPECIFIC PROCEDURE      LUNG ABSCESS SURGERY-empyema ; dr Mendes       Social History     Tobacco Use     Smoking status: Former Smoker     Last attempt to quit: 1994     Years since quittin.0     Smokeless tobacco: Never Used   Substance Use Topics     Alcohol use: Yes     Alcohol/week: 0.0 oz     Family History   Problem Relation Age of Onset     C.A.D. Father          age 58     Hypertension Father      Hypertension Mother      Diabetes Mother      Lipids Mother      Osteoporosis Mother          age 80 after  "hip fracture     Hypertension Maternal Grandfather      Gastrointestinal Disease Sister         Crohns     Gastrointestinal Disease Sister         Crohns         Current Outpatient Medications   Medication Sig Dispense Refill     aspirin 81 MG tablet Take by mouth daily       atorvastatin (LIPITOR) 20 MG tablet Take 1 tablet (20 mg) by mouth daily 20 tablet 0     lisinopril-hydrochlorothiazide (PRINZIDE/ZESTORETIC) 20-12.5 MG per tablet Take 1 tablet by mouth daily 90 tablet 3     multivitamin, therapeutic with minerals (MULTI-VITAMIN) TABS Take 1 tablet by mouth daily       simvastatin (ZOCOR) 20 MG tablet Take 1 tablet (20 mg) by mouth At Bedtime 30 tablet 0       Reviewed and updated as needed this visit by clinical staff       Reviewed and updated as needed this visit by Provider         ROS:  Constitutional, HEENT, cardiovascular, pulmonary, GI, , musculoskeletal, neuro, skin, endocrine and psych systems are negative, except as otherwise noted.    OBJECTIVE:     /86 (BP Location: Left arm, Patient Position: Sitting, Cuff Size: Adult Large)   Pulse 111   Temp 98.1  F (36.7  C) (Oral)   Ht 1.781 m (5' 10.1\")   Wt 109 kg (240 lb 6.4 oz)   SpO2 97%   BMI 34.40 kg/m    Body mass index is 34.4 kg/m .   GENERAL: healthy, alert and no distress  NECK: no adenopathy, no asymmetry, masses, or scars and thyroid normal to palpation  RESP: lungs clear to auscultation - no rales, rhonchi or wheezes  CV: regular rate and rhythm, normal S1 S2, no S3 or S4, no murmur, click or rub, no peripheral edema and peripheral pulses strong  MS: no gross musculoskeletal defects noted, no edema  Skin: seborhea keratosis on his right posterior shoulder    Diagnostic Test Results:  No results found for this or any previous visit (from the past 24 hour(s)).    ASSESSMENT/PLAN:     (Z23) Need for vaccination  (primary encounter diagnosis)  Comment:   Plan: HC FLU VAC PRESRV FREE QUAD SPLIT VIR 3+YRS IM,        VACCINE " "ADMINISTRATION, INITIAL, C HEP A VAC         ADULT 2 DOSE IM, VACCINE ADMINISTRATION, EACH         ADDITIONAL            (E78.00) Pure hypercholesterolemia  Comment: refilled at current dose for one year  Plan: atorvastatin (LIPITOR) 20 MG tablet, ALT         ([facility]), VENOUS COLLECTION, Lipid Profile            (I10) Essential hypertension, benign  Comment: continue current medication- refilled for one year  Plan: lisinopril-hydrochlorothiazide         (PRINZIDE/ZESTORETIC) 20-12.5 MG tablet, BASIC         METABOLIC PANEL ([facility]), VENOUS COLLECTION          Seborrhea Keratosis- no treatment needed       BMI:   Estimated body mass index is 34.4 kg/m  as calculated from the following:    Height as of this encounter: 1.781 m (5' 10.1\").    Weight as of this encounter: 109 kg (240 lb 6.4 oz).   Weight management plan: Discussed healthy diet and exercise guidelines      FUTURE APPOINTMENTS:       - Follow-up visit in 6 months to recheck blood pressure    Judy Cantu MD  [facility]    "

## 2019-01-19 LAB
ALT SERPL-CCNC: 28 U/L (ref 9–46)
BUN SERPL-MCNC: 17 MG/DL (ref 7–25)
BUN/CREATININE RATIO: ABNORMAL (CALC) (ref 6–22)
CALCIUM SERPL-MCNC: 9.7 MG/DL (ref 8.6–10.3)
CHLORIDE SERPLBLD-SCNC: 98 MMOL/L (ref 98–110)
CHOLEST SERPL-MCNC: 141 MG/DL
CHOLEST/HDLC SERPL: 4 (CALC)
CO2 SERPL-SCNC: 28 MMOL/L (ref 20–32)
CREAT SERPL-MCNC: 1.06 MG/DL (ref 0.7–1.33)
EGFR AFRICAN AMERICAN - QUEST: 94 ML/MIN/1.73M2
GFR SERPL CREATININE-BSD FRML MDRD: 81 ML/MIN/1.73M2
GLUCOSE - QUEST: 107 MG/DL (ref 65–99)
HDLC SERPL-MCNC: 35 MG/DL
LDLC SERPL CALC-MCNC: 81 MG/DL (CALC)
NONHDLC SERPL-MCNC: 106 MG/DL (CALC)
POTASSIUM SERPL-SCNC: 4.1 MMOL/L (ref 3.5–5.3)
SODIUM SERPL-SCNC: 136 MMOL/L (ref 135–146)
TRIGL SERPL-MCNC: 146 MG/DL

## 2019-11-27 ENCOUNTER — HOSPITAL ENCOUNTER (EMERGENCY)
Facility: CLINIC | Age: 49
Discharge: HOME | End: 2019-11-27
Attending: EMERGENCY MEDICINE | Admitting: EMERGENCY MEDICINE
Payer: COMMERCIAL

## 2019-11-27 VITALS
DIASTOLIC BLOOD PRESSURE: 91 MMHG | TEMPERATURE: 98 F | OXYGEN SATURATION: 97 % | SYSTOLIC BLOOD PRESSURE: 151 MMHG | HEART RATE: 88 BPM | RESPIRATION RATE: 15 BRPM

## 2019-11-27 DIAGNOSIS — R00.2 PALPITATIONS: ICD-10-CM

## 2019-11-27 DIAGNOSIS — I49.3 PVC'S (PREMATURE VENTRICULAR CONTRACTIONS): ICD-10-CM

## 2019-11-27 LAB
ANION GAP SERPL CALCULATED.3IONS-SCNC: 5 MMOL/L (ref 3–14)
BASOPHILS # BLD AUTO: 0.1 10E9/L (ref 0–0.2)
BASOPHILS NFR BLD AUTO: 0.4 %
BUN SERPL-MCNC: 13 MG/DL (ref 7–30)
CALCIUM SERPL-MCNC: 8.9 MG/DL (ref 8.5–10.1)
CHLORIDE SERPL-SCNC: 104 MMOL/L (ref 94–109)
CO2 SERPL-SCNC: 26 MMOL/L (ref 20–32)
CREAT SERPL-MCNC: 0.94 MG/DL (ref 0.66–1.25)
DIFFERENTIAL METHOD BLD: ABNORMAL
EOSINOPHIL # BLD AUTO: 0.1 10E9/L (ref 0–0.7)
EOSINOPHIL NFR BLD AUTO: 1 %
ERYTHROCYTE [DISTWIDTH] IN BLOOD BY AUTOMATED COUNT: 12.7 % (ref 10–15)
GFR SERPL CREATININE-BSD FRML MDRD: >90 ML/MIN/{1.73_M2}
GLUCOSE SERPL-MCNC: 109 MG/DL (ref 70–99)
HCT VFR BLD AUTO: 41.8 % (ref 40–53)
HGB BLD-MCNC: 14.1 G/DL (ref 13.3–17.7)
IMM GRANULOCYTES # BLD: 0 10E9/L (ref 0–0.4)
IMM GRANULOCYTES NFR BLD: 0.3 %
LYMPHOCYTES # BLD AUTO: 2.4 10E9/L (ref 0.8–5.3)
LYMPHOCYTES NFR BLD AUTO: 18.8 %
MAGNESIUM SERPL-MCNC: 2 MG/DL (ref 1.6–2.3)
MCH RBC QN AUTO: 29.6 PG (ref 26.5–33)
MCHC RBC AUTO-ENTMCNC: 33.7 G/DL (ref 31.5–36.5)
MCV RBC AUTO: 88 FL (ref 78–100)
MONOCYTES # BLD AUTO: 0.7 10E9/L (ref 0–1.3)
MONOCYTES NFR BLD AUTO: 5.2 %
NEUTROPHILS # BLD AUTO: 9.3 10E9/L (ref 1.6–8.3)
NEUTROPHILS NFR BLD AUTO: 74.3 %
NRBC # BLD AUTO: 0 10*3/UL
NRBC BLD AUTO-RTO: 0 /100
PLATELET # BLD AUTO: 295 10E9/L (ref 150–450)
POTASSIUM SERPL-SCNC: 3.5 MMOL/L (ref 3.4–5.3)
RBC # BLD AUTO: 4.76 10E12/L (ref 4.4–5.9)
SODIUM SERPL-SCNC: 135 MMOL/L (ref 133–144)
TROPONIN I SERPL-MCNC: <0.015 UG/L (ref 0–0.04)
TSH SERPL DL<=0.005 MIU/L-ACNC: 1.84 MU/L (ref 0.4–4)
WBC # BLD AUTO: 12.5 10E9/L (ref 4–11)

## 2019-11-27 PROCEDURE — 99284 EMERGENCY DEPT VISIT MOD MDM: CPT

## 2019-11-27 PROCEDURE — 84443 ASSAY THYROID STIM HORMONE: CPT | Performed by: EMERGENCY MEDICINE

## 2019-11-27 PROCEDURE — 85025 COMPLETE CBC W/AUTO DIFF WBC: CPT | Performed by: EMERGENCY MEDICINE

## 2019-11-27 PROCEDURE — 80048 BASIC METABOLIC PNL TOTAL CA: CPT | Performed by: EMERGENCY MEDICINE

## 2019-11-27 PROCEDURE — 93005 ELECTROCARDIOGRAM TRACING: CPT

## 2019-11-27 PROCEDURE — 83735 ASSAY OF MAGNESIUM: CPT | Performed by: EMERGENCY MEDICINE

## 2019-11-27 PROCEDURE — 84484 ASSAY OF TROPONIN QUANT: CPT | Performed by: EMERGENCY MEDICINE

## 2019-11-27 ASSESSMENT — ENCOUNTER SYMPTOMS
RHINORRHEA: 0
VOMITING: 0
SHORTNESS OF BREATH: 0
FEVER: 0
ABDOMINAL PAIN: 0
PALPITATIONS: 1

## 2019-11-27 NOTE — ED PROVIDER NOTES
"  History     Chief Complaint:  Palpitations      HPI   Rohan Ventura is a 49 year old male who presents with ***    Allergies:  No known allergies    Medications:    atorvastatin  Lisinopril  Simvastatin     Past Medical History:    Hyperlipidemia   Hypertension   Labyrinthitis  Obesity  Panic disorder       Past Surgical History:    Lung abscess surgery     Family History:    Father: coronary artery disease, hypertension  Mother: hypertension, diabetes, hyperlipidemia, osteoporosis  Sister: Chron's disease     Social History:  The patient was accompanied to the ED by ***.  Smoking Status: former smoker   Smokeless Tobacco: Never Used  Alcohol Use: Yes  Drug Use: No  PCP: Judy Cantu  Marital Status:      Review of Systems  ***    Physical Exam     Patient Vitals for the past 24 hrs:   BP Temp Pulse Resp SpO2   11/27/19 1707 (!) 177/97 98  F (36.7  C) 96 16 98 %         Physical Exam  ***    Emergency Department Course   ECG:  ECG taken at ***, ECG read at ***  Normal sinus rhythm***  *** ECG  ***  Rate *** bpm. VA interval *** ms. QRS duration *** ms. QT/QTc *** ms. P-R-T axes *** *** ***.    Imaging:  Radiology findings were communicated with the *** who voiced understanding of the findings.    No orders to display       Laboratory:  Laboratory findings were communicated with the *** who voiced understanding of the findings.    ***  CBC: WBC ***, HGB ***, PLT ***  ***MP: *** o/w WNL (Creatinine ***)  ***    Procedures    Interventions:  ***    Emergency Department Course:  Nursing notes and vitals reviewed.    *** I performed an exam of the patient as documented above.     *** ***    *** ***    *** ***    Impression & Plan      CMS Diagnoses: {Sepsis/Septic Shock/Stemi/Stroke:399814::\" \"}    {trauma activation?:928138::\" \"}    Medical Decision Making:  Rohan Ventura is a 49 year old male who presents to the emergency department today for evaluation of ***    Critical Care time was *** " minutes for this patient excluding procedures.     Diagnosis:  No diagnosis found.    Disposition:   ***    Discharge Medications:  ***  New Prescriptions    No medications on file       Scribe Disclosure:  I, Debbi Howell, am serving as a scribe at 5:29 PM on 11/27/2019 to document services personally performed by Peng Nieves MD*** based on my observations and the provider's statements to me.     [facility] EMERGENCY DEPARTMENT

## 2019-11-27 NOTE — ED TRIAGE NOTES
Patient presents to the ED with palpitations for the past few days. Reports has felt irregularities or skipped beats when checking pulse.

## 2019-11-27 NOTE — ED AVS SNAPSHOT
[facility] Emergency Department  [address]  [facility] [address]  Phone:  [phone]  Fax:  [phone]                                    Rohan Ventura   MRN: [id number]    Department:  [facility] Emergency Department   Date of Visit:  11/27/2019           After Visit Summary Signature Page    I have received my discharge instructions, and my questions have been answered. I have discussed any challenges I see with this plan with the nurse or doctor.    ..........................................................................................................................................  Patient/Patient Representative Signature      ..........................................................................................................................................  Patient Representative Print Name and Relationship to Patient    ..................................................               ................................................  Date                                   Time    ..........................................................................................................................................  Reviewed by Signature/Title    ...................................................              ..............................................  Date                                               Time          22EPIC Rev 08/18

## 2019-11-27 NOTE — ED PROVIDER NOTES
"  History     Chief Complaint:  Palpitations    The history is provided by the patient.      Rohan Ventura is a 49 year old male who presents for evaluation of palpitations. For the last five days, the patient states he has felt a \"flutter\" in his chest a couple times each day. When he feels this flutter, he reports he can feel his heart rate skipping a beat when he checks his carotid pulse, but denies any associated chest pain, shortness of breath, or other pains. Given that he has never experienced this symptoms before and the length of time he has had them, he presented to the ED today. He currently denies any symptoms. He denies any recent cold symptoms, vomiting, or other illness. He has no recent surgeries. He does report a syncopal episode a couple of years ago after which he was hospitalized due to some abnormal EKG findings; the work up at that time was otherwise normal, and he has had no other occurrences. He denies regular alcohol use, but does endorse drinking two 36 oz of Diet Cokes per day.     Allergies:  No Known Drug Allergies    Medications:    Baby aspirin  Atorvastatin  Lisinopril-hydrochlorothiazide  Simvastatin     Past Medical History:    Hypertension  Hyperlipidemia  Panic disorder    Past Surgical History:    Lung abscess surgery    Family History:    Father: CAD ( at 58), Hypertension  Mother: Hypertension, Diabetes, Osteoporosis, hyperlipidemia  Sister: Crohn's disease    Social History:  Marital Status:   [2]  Presents to the ED with wife  Former smoker  Negative for smokeless tobacco use  Positive for alcohol use.   Negative for drug use.     Review of Systems   Constitutional: Negative for fever.   HENT: Negative for congestion and rhinorrhea.    Respiratory: Negative for shortness of breath.    Cardiovascular: Positive for palpitations. Negative for chest pain.   Gastrointestinal: Negative for abdominal pain and vomiting.   All other systems reviewed and are " negative.        Physical Exam     Patient Vitals for the past 24 hrs:   BP Temp Pulse Heart Rate Resp SpO2   11/27/19 1830 (!) 130/93 -- 78 84 13 98 %   11/27/19 1815 (!) 146/82 -- 85 80 13 98 %   11/27/19 1800 (!) 148/88 -- 82 82 15 99 %   11/27/19 1745 (!) 144/88 -- 78 81 17 99 %   11/27/19 1730 (!) 156/88 -- 85 80 22 98 %   11/27/19 1715 (!) 148/93 -- 85 86 17 97 %   11/27/19 1707 (!) 177/97 98  F (36.7  C) 96 -- 16 98 %      Physical Exam  Vital signs and nursing notes reviewed.     Constitutional: laying on gurney appears comfortable  HENT: Oropharynx is clear and moist  Eyes: Conjunctivae are normal bilaterally. Pupils equal  Neck: normal range of motion  Cardiovascular: Normal rate, regular rhythm, normal heart sounds.   Pulmonary/Chest: Effort normal and breath sounds normal. No respiratory distress.   Abdominal: Soft. Bowel sounds are normal. No tenderness to palpation. No rebound or guarding.   Musculoskeletal: No joint swelling or edema.   Neurological: Alert and oriented. No focal weakness  Skin: Skin is warm and dry. No rash noted.   Psych: normal affect    Emergency Department Course   ECG:  Indication: Palpitations  Time: 1711  Vent. Rate 92 bpm. CO interval 144. QRS duration 96. QT/QTc 344/425. P-R-T axis 37 16 67.   Normal sinus rhythm.   Possible inferior infarct, age undetermined.  Abnormal ECG. Read time: 1720.    Laboratory:  CBC: WBC: 12.5 (H), HGB: 14.1, PLT: 295  BMP: Glucose 109 (H), o/w WNL (Creatinine: 0.94)  1806 Troponin: <0.015   Magnesium: 2.0   TSH: 1.84    Procedures:  None    Emergency Department Course:  Nursing notes and vitals reviewed.   1800: I performed an exam of the patient as documented above.      Patient was placed on continuous cardiac monitoring.   EKG obtained in the ED, see results above.   IV was inserted and blood was drawn for laboratory testing, results above.     1853: I rechecked the patient and discussed the results of his workup thus far.     Findings and  plan explained to the patient. Patient discharged home with instructions regarding supportive care, medications, and reasons to return. The importance of close follow-up was reviewed.     I personally reviewed the laboratory results with the patient and answered all related questions prior to discharge.    Impression & Plan      Medical Decision Making:  Rohan Ventura is a 49 year old male who presents with feelings of intermittent palpitations. He has had no syncope or specific chest pains, shortness of breath, or other complaints. His EKG is unremarkable, but while he was on continuous cardiac monitoring, it was noted he does have an occasional single PVC. Lab tests show no abnormalities. Patient was reassured that his symptomatology, as far as the symptoms he is feeling, is likely due to the PVC and this is a benign pathology. He is advised to follow-up with his PCP. He may benefit from a beta blocker if he continues to have symptomatic PVC's that are bothersome for him. Patient will follow-up with his PCP and discharged home in good condition. There is no suggestion pulmonary embolism, acute coronary syndrome, or other dangerous pathology. I felt he could be safely discharged home.     Diagnosis:    ICD-10-CM    1. Palpitations R00.2    2. PVC's (premature ventricular contractions) I49.3        Disposition:  discharged to home    Scribe Disclosure:  I, Amberly Renaldo, am serving as a scribe on 11/27/2019 at 6:52 PM to personally document services performed by Peng Nieves MD based on my observations and the provider's statements to me.      11/27/2019   [facility] EMERGENCY DEPARTMENT       Peng Nieves MD  11/28/19 8231

## 2019-11-28 LAB — INTERPRETATION ECG - MUSE: NORMAL

## 2019-11-28 NOTE — ED NOTES
Pt resting Comfortably on bed.  Alert & Oriented  Pt denies any palpitations at this time,.  EKG: NSR without ectopy.

## 2019-12-15 ENCOUNTER — HEALTH MAINTENANCE LETTER (OUTPATIENT)
Age: 49
End: 2019-12-15

## 2020-01-08 ENCOUNTER — OFFICE VISIT (OUTPATIENT)
Dept: FAMILY MEDICINE | Facility: CLINIC | Age: 50
End: 2020-01-08

## 2020-01-08 VITALS
HEIGHT: 70 IN | TEMPERATURE: 98.1 F | BODY MASS INDEX: 34.79 KG/M2 | WEIGHT: 243 LBS | DIASTOLIC BLOOD PRESSURE: 84 MMHG | SYSTOLIC BLOOD PRESSURE: 150 MMHG | OXYGEN SATURATION: 97 % | HEART RATE: 84 BPM

## 2020-01-08 DIAGNOSIS — E78.00 PURE HYPERCHOLESTEROLEMIA: ICD-10-CM

## 2020-01-08 DIAGNOSIS — R68.82 LOW LIBIDO: ICD-10-CM

## 2020-01-08 DIAGNOSIS — I10 ESSENTIAL HYPERTENSION, BENIGN: ICD-10-CM

## 2020-01-08 DIAGNOSIS — R00.2 PALPITATIONS: Primary | ICD-10-CM

## 2020-01-08 PROCEDURE — 99213 OFFICE O/P EST LOW 20 MIN: CPT | Performed by: FAMILY MEDICINE

## 2020-01-08 ASSESSMENT — MIFFLIN-ST. JEOR: SCORE: 1963.49

## 2020-01-09 NOTE — NURSING NOTE
Rohan is here to discuss ED and his recent visit to the ER.          Pre-visit Screening:  Immunizations:  up to date  Colonoscopy:  is up to date  Mammogram: NA  Asthma Action Test/Plan:  NA  PHQ9:  None  GAD7:  None  Questioned patient about current smoking habits Pt. has never smoked.  Ok to leave detailed message on voice mail for today's visit only Yes, phone # cell

## 2020-01-09 NOTE — PROGRESS NOTES
"SUBJECTIVE:  Rohan Ventura, a 49 year old male scheduled an appointment to discuss the following issues:     Palpitations  Low libido  PT was in ED for palpitations-diagnosed as PVC's- not present now.  NOtes reviewed.  He is not interested in further investigation now.    PT also wishes to discuss low libido.  He thinks this has been an issue for many years but most problematic in his relationship with his wife in last 3 years.  He is able to get and keep erections but has no desire.  His wife is worried he has a prostate or testicular issue.    PT does admit he does not sleep nearly enough and has not been exercising until recently.       Medical, social, surgical, and family histories reviewed.    ROS:  CONSTITUTIONAL: NEGATIVE for fever, chills  EYES: NEGATIVE for vision changes   RESP: NEGATIVE for significant cough or SOB  CV: NEGATIVE for chest pain, palpitations   GI: NEGATIVE for nausea, abdominal pain, heartburn, or change in bowel habits  : NEGATIVE for frequency, dysuria, or hematuria  MUSCULOSKELETAL: NEGATIVE for significant arthralgias or myalgia  NEURO: NEGATIVE for weakness, dizziness or paresthesias or headache    OBJECTIVE:  BP (!) 150/84 (BP Location: Right arm, Patient Position: Sitting, Cuff Size: Adult Large)   Pulse 84   Temp 98.1  F (36.7  C) (Oral)   Ht 1.778 m (5' 10\")   Wt 110.2 kg (243 lb)   SpO2 97%   BMI 34.87 kg/m    EXAM:  GENERAL APPEARANCE: healthy, alert and no distress  EYES: EOMI,  PERRL  HENT: ear canals and TM's normal and nose and mouth without ulcers or lesions  RESP: lungs clear to auscultation - no rales, rhonchi or wheezes  CV: regular rates and rhythm, normal S1 S2, no S3 or S4 and no murmur, click or rub -  ABDOMEN:  soft, nontender, no HSM or masses and bowel sounds normal  PSYCH: mentation appears normal and affect normal/bright    ASSESSMENT/PLAN:  (R00.2) Palpitations  (primary encounter diagnosis)  Comment: no current symptoms , discussed PVCs  Plan: " consider ziopatch if issues recur and are bothersome    patient given instructions to go to emergency department immediately if worsening of symptoms and verbalizes this understanding     (R68.82) Low libido  Comment: discussed low libido in some detail, we agree it reasonable to check testosterone but discussed his lifestyle /habits need to improve I order to help- wife wants PSA and pt plans to also do fating labs when blood is drawn  Plan: HCL PSA, SCREENING ([facility]), TESTOSTERONE TOTAL         ([facility]), VENOUS COLLECTION        Check labs, work on sleep, exercise, diet, stress, consider referral to therapist

## 2020-01-11 DIAGNOSIS — E78.00 PURE HYPERCHOLESTEROLEMIA: ICD-10-CM

## 2020-01-11 DIAGNOSIS — I10 ESSENTIAL HYPERTENSION, BENIGN: ICD-10-CM

## 2020-01-11 DIAGNOSIS — R68.82 LOW LIBIDO: ICD-10-CM

## 2020-01-11 LAB
ALBUMIN SERPL-MCNC: 4.6 G/DL (ref 3.6–5.1)
ALBUMIN/GLOB SERPL: 1.6 {RATIO} (ref 1–2.5)
ALP SERPL-CCNC: 91 U/L (ref 33–130)
ALT 1742-6: 18 U/L (ref 0–32)
AST 1920-8: 14 U/L (ref 0–35)
BILIRUB SERPL-MCNC: 1.1 MG/DL (ref 0.2–1.2)
BUN SERPL-MCNC: 12 MG/DL (ref 7–25)
BUN/CREATININE RATIO: 11.2 (ref 6–22)
CALCIUM SERPL-MCNC: 9.5 MG/DL (ref 8.6–10.3)
CHLORIDE SERPLBLD-SCNC: 102.3 MMOL/L (ref 98–110)
CHOLEST SERPL-MCNC: 141 MG/DL (ref 0–199)
CHOLEST/HDLC SERPL: 3 {RATIO} (ref 0–5)
CO2 SERPL-SCNC: 25.9 MMOL/L (ref 20–32)
CREAT SERPL-MCNC: 1.07 MG/DL (ref 0.7–1.18)
GLOBULIN, CALCULATED - QUEST: 2.9 (ref 1.9–3.7)
GLUCOSE SERPL-MCNC: 91 MG/DL (ref 60–99)
HDLC SERPL-MCNC: 44 MG/DL (ref 40–150)
LDLC SERPL CALC-MCNC: 75 MG/DL (ref 0–130)
POTASSIUM SERPL-SCNC: 4.88 MMOL/L (ref 3.5–5.3)
PROT SERPL-MCNC: 7.5 G/DL (ref 6.1–8.1)
SODIUM SERPL-SCNC: 139.6 MMOL/L (ref 135–146)
TRIGL SERPL-MCNC: 111 MG/DL (ref 0–149)

## 2020-01-11 PROCEDURE — 80053 COMPREHEN METABOLIC PANEL: CPT | Performed by: FAMILY MEDICINE

## 2020-01-11 PROCEDURE — 80061 LIPID PANEL: CPT | Performed by: FAMILY MEDICINE

## 2020-01-11 PROCEDURE — 36415 COLL VENOUS BLD VENIPUNCTURE: CPT | Performed by: FAMILY MEDICINE

## 2020-01-17 LAB
ABBOTT PSA - QUEST: 0.6 NG/ML
TESTOSTERONE, TOTAL, LC/MS/MS-QUEST: 409 NG/DL (ref 250–1100)

## 2020-05-06 ENCOUNTER — OFFICE VISIT (OUTPATIENT)
Dept: FAMILY MEDICINE | Facility: CLINIC | Age: 50
End: 2020-05-06

## 2020-05-06 VITALS
TEMPERATURE: 97.9 F | WEIGHT: 248.8 LBS | HEART RATE: 88 BPM | HEIGHT: 70 IN | DIASTOLIC BLOOD PRESSURE: 102 MMHG | BODY MASS INDEX: 35.62 KG/M2 | SYSTOLIC BLOOD PRESSURE: 162 MMHG | OXYGEN SATURATION: 98 %

## 2020-05-06 DIAGNOSIS — H60.502 ACUTE OTITIS EXTERNA OF LEFT EAR, UNSPECIFIED TYPE: Primary | ICD-10-CM

## 2020-05-06 DIAGNOSIS — E78.00 PURE HYPERCHOLESTEROLEMIA: ICD-10-CM

## 2020-05-06 DIAGNOSIS — I10 ESSENTIAL HYPERTENSION, BENIGN: ICD-10-CM

## 2020-05-06 PROCEDURE — 99213 OFFICE O/P EST LOW 20 MIN: CPT | Performed by: FAMILY MEDICINE

## 2020-05-06 RX ORDER — LISINOPRIL AND HYDROCHLOROTHIAZIDE 12.5; 2 MG/1; MG/1
1 TABLET ORAL DAILY
Qty: 90 TABLET | Refills: 1 | Status: SHIPPED | OUTPATIENT
Start: 2020-05-06 | End: 2021-01-07

## 2020-05-06 RX ORDER — NEOMYCIN SULFATE, POLYMYXIN B SULFATE AND HYDROCORTISONE 10; 3.5; 1 MG/ML; MG/ML; [USP'U]/ML
4 SUSPENSION/ DROPS AURICULAR (OTIC) 4 TIMES DAILY
Qty: 1 BOTTLE | Refills: 0 | Status: SHIPPED | OUTPATIENT
Start: 2020-05-06 | End: 2020-11-30

## 2020-05-06 RX ORDER — ATORVASTATIN CALCIUM 20 MG/1
20 TABLET, FILM COATED ORAL DAILY
Qty: 90 TABLET | Refills: 1 | Status: SHIPPED | OUTPATIENT
Start: 2020-05-06 | End: 2021-01-07

## 2020-05-06 ASSESSMENT — MIFFLIN-ST. JEOR: SCORE: 1984.8

## 2020-05-06 NOTE — NURSING NOTE
Rohan is here today for ear pain.    Pre-visit Screening:  Immunizations:  up to date  Colonoscopy:  is up to date  Mammogram: NA  Asthma Action Test/Plan:  NA  PHQ9:  NA  GAD7:  NA  Questioned patient about current smoking habits Pt. quit smoking some time ago.  Ok to leave detailed message on voice mail for today's visit only Yes, phone # [phone]

## 2020-05-06 NOTE — PROGRESS NOTES
"SUBJECTIVE:   Rohan Ventura is a 50 year old male who complains of left ear pain, fullness and pressure for 6 days. He tried wax drops 4 days ago.  Pain seemed better but still some pressure until today pain worse again.  Pain seems better when he clenches jaw at times. He denies a history of  other unusual symptoms and denies a history of asthma. Patient does not smoke cigarettes.     OBJECTIVE:BP (!) 162/102 (BP Location: Right arm, Patient Position: Sitting, Cuff Size: Adult Large)   Pulse 88   Temp 97.9  F (36.6  C) (Oral)   Ht 1.778 m (5' 10\")   Wt 112.9 kg (248 lb 12.8 oz)   SpO2 98%   BMI 35.70 kg/m     He appears well, vital signs are as noted by the nurse. EARS: - left canal reveals swelling , erythema, mattering.   Throat and pharynx normal.  Neck supple. No adenopathy in the neck. Nose is congested. Sinuses non tender. The chest is clear, without wheezes or rales.    ASSESSMENT:   Otitis externa -left    PLAN:cortisporin otic  Symptomatic therapy suggested: push fluids. Call or return to clinic prn if these symptoms worsen or fail to improve as anticipated.   "

## 2020-11-24 ENCOUNTER — TELEPHONE (OUTPATIENT)
Dept: FAMILY MEDICINE | Facility: CLINIC | Age: 50
End: 2020-11-24

## 2020-11-24 NOTE — TELEPHONE ENCOUNTER
Rohan's wife called to say that Rohan has been positive for Covid for 2 weeks.  His oxygen today was 90 and he still has a fever.  [facility] told him he wasn't sick enough to stay and they sent him home.  I told her with his oxygen levels so low and the fever, he needs to go back, but I suggested he try a different ER at [facility], or [facility] or [facility] but with those stats he needs to go back in case he has pneumonia as this can turn for the worse on a dime so I didn't want him to miss anything.  She verbalized understanding.

## 2020-11-30 ENCOUNTER — OFFICE VISIT (OUTPATIENT)
Dept: FAMILY MEDICINE | Facility: CLINIC | Age: 50
End: 2020-11-30

## 2020-11-30 VITALS
TEMPERATURE: 98.3 F | SYSTOLIC BLOOD PRESSURE: 142 MMHG | HEIGHT: 70 IN | DIASTOLIC BLOOD PRESSURE: 100 MMHG | HEART RATE: 94 BPM | BODY MASS INDEX: 32.93 KG/M2 | OXYGEN SATURATION: 96 % | WEIGHT: 230 LBS

## 2020-11-30 DIAGNOSIS — U07.1 PNEUMONIA DUE TO 2019 NOVEL CORONAVIRUS: Primary | ICD-10-CM

## 2020-11-30 DIAGNOSIS — J12.82 PNEUMONIA DUE TO 2019 NOVEL CORONAVIRUS: Primary | ICD-10-CM

## 2020-11-30 PROCEDURE — 90686 IIV4 VACC NO PRSV 0.5 ML IM: CPT | Performed by: FAMILY MEDICINE

## 2020-11-30 PROCEDURE — 90471 IMMUNIZATION ADMIN: CPT | Performed by: FAMILY MEDICINE

## 2020-11-30 PROCEDURE — 99213 OFFICE O/P EST LOW 20 MIN: CPT | Mod: 25 | Performed by: FAMILY MEDICINE

## 2020-11-30 RX ORDER — HYDROCODONE BITARTRATE AND ACETAMINOPHEN 5; 325 MG/1; MG/1
1 TABLET ORAL
COMMUNITY
Start: 2020-11-24 | End: 2021-08-18

## 2020-11-30 RX ORDER — DEXAMETHASONE 6 MG/1
TABLET ORAL
COMMUNITY
Start: 2020-11-24 | End: 2021-08-18

## 2020-11-30 ASSESSMENT — MIFFLIN-ST. JEOR: SCORE: 1899.52

## 2020-11-30 NOTE — NURSING NOTE
Rohan is here for follow up of pneumonia + Covid needs FMLA forms filled out    Pre-visit Screening:  Immunizations:  Flu shot today  Colonoscopy:  UTD  Mammogram: NA  Asthma Action Test/Plan:  NA  PHQ9:  NA  GAD7:  NA  Questioned patient about current smoking habits Pt. quit smoking some time ago.  Ok to leave detailed message on voice mail for today's visit only Yes, phone # [phone]

## 2020-11-30 NOTE — PROGRESS NOTES
"SUBJECTIVE:  Rohan Ventura, a 50 year old male scheduled an appointment to discuss the following issues:  Pneumonia due to 2019 novel coronavirus     Pt tested positive for covid 19 on 11/15, was in ED 11/20 and again 11/24 due to shortness of breath - diagnosed with covid pneumonia- treated with steroids after visit 11/24 for 5 days. Pt states he is feeling almost back to normal. He is able to exercise without shortness of breath .    Pt wants to go back to work-applied for FMLA and needs forms completed- they were supposed to be faxed here but are not here yet    Pt called out of work first on 11/13/20 when symptoms began, temporary FMLA from 11/13 through 12/4    Medical, social, surgical, and family histories reviewed.    ROS:  CONSTITUTIONAL: NEGATIVE for fever, chills  EYES: NEGATIVE for vision changes   RESP: NEGATIVE for significant cough or SOB  CV: NEGATIVE for chest pain, palpitations   GI: NEGATIVE for nausea, abdominal pain, heartburn, or change in bowel habits  : NEGATIVE for frequency, dysuria, or hematuria  MUSCULOSKELETAL: NEGATIVE for significant arthralgias or myalgia  NEURO: NEGATIVE for weakness, dizziness or paresthesias or headache    OBJECTIVE:  BP (!) 142/100 (BP Location: Left arm, Patient Position: Sitting, Cuff Size: Adult Large)   Pulse 94   Temp 98.3  F (36.8  C) (Oral)   Ht 1.778 m (5' 10\")   Wt 104.3 kg (230 lb)   SpO2 96%   BMI 33.00 kg/m    EXAM:  GENERAL APPEARANCE: healthy, alert and no distress  EYES: EOMI,  PERRL  HENT: ear canals and TM's normal and nose and mouth without ulcers or lesions  RESP: lungs clear to auscultation - no rales, rhonchi or wheezes  CV: regular rates and rhythm, normal S1 S2, no S3 or S4 and no murmur, click or rub -  ABDOMEN:  soft, nontender, no HSM or masses and bowel sounds normal    ASSESSMENT/PLAN:  (U07.1,  J12.89) Pneumonia due to 2019 novel coronavirus  (primary encounter diagnosis)  Comment: resolving clinically, will be ready to " return to work after 12/4 as planned  Plan: I will complete forms when they arrive

## 2020-12-01 ENCOUNTER — TELEPHONE (OUTPATIENT)
Dept: FAMILY MEDICINE | Facility: CLINIC | Age: 50
End: 2020-12-01

## 2021-01-07 ENCOUNTER — OFFICE VISIT (OUTPATIENT)
Dept: FAMILY MEDICINE | Facility: CLINIC | Age: 51
End: 2021-01-07

## 2021-01-07 VITALS
HEART RATE: 92 BPM | TEMPERATURE: 97.8 F | RESPIRATION RATE: 20 BRPM | HEIGHT: 70 IN | SYSTOLIC BLOOD PRESSURE: 158 MMHG | WEIGHT: 232.6 LBS | DIASTOLIC BLOOD PRESSURE: 90 MMHG | BODY MASS INDEX: 33.3 KG/M2

## 2021-01-07 DIAGNOSIS — I10 ESSENTIAL HYPERTENSION, BENIGN: ICD-10-CM

## 2021-01-07 DIAGNOSIS — U07.1 PNEUMONIA DUE TO 2019 NOVEL CORONAVIRUS: ICD-10-CM

## 2021-01-07 DIAGNOSIS — M54.50 ACUTE RIGHT-SIDED LOW BACK PAIN WITHOUT SCIATICA: Primary | ICD-10-CM

## 2021-01-07 DIAGNOSIS — E78.00 PURE HYPERCHOLESTEROLEMIA: ICD-10-CM

## 2021-01-07 DIAGNOSIS — J12.82 PNEUMONIA DUE TO 2019 NOVEL CORONAVIRUS: ICD-10-CM

## 2021-01-07 LAB
ALBUMIN (URINE): ABNORMAL MG/DL
ALBUMIN SERPL-MCNC: 4.5 G/DL (ref 3.6–5.1)
ALBUMIN/GLOB SERPL: 1.6 {RATIO} (ref 1–2.5)
ALP SERPL-CCNC: 80 U/L (ref 33–130)
ALT 1742-6: 23 U/L (ref 0–32)
APPEARANCE UR: CLEAR
AST 1920-8: 15 U/L (ref 0–35)
BACTERIA, UR: ABNORMAL
BILIRUB SERPL-MCNC: 1.2 MG/DL (ref 0.2–1.2)
BILIRUB UR QL: ABNORMAL
BUN SERPL-MCNC: 7 MG/DL (ref 7–25)
BUN/CREATININE RATIO: 6.7 (ref 6–22)
CALCIUM SERPL-MCNC: 9.8 MG/DL (ref 8.6–10.3)
CASTS/LPF: ABNORMAL
CHLORIDE SERPLBLD-SCNC: 100.4 MMOL/L (ref 98–110)
CHOLEST SERPL-MCNC: 182 MG/DL (ref 0–199)
CHOLEST/HDLC SERPL: 5 {RATIO} (ref 0–5)
CO2 SERPL-SCNC: 30.3 MMOL/L (ref 20–32)
COLOR UR: YELLOW
CREAT SERPL-MCNC: 1.05 MG/DL (ref 0.7–1.18)
EP/HPF: ABNORMAL
GLOBULIN, CALCULATED - QUEST: 2.9 (ref 1.9–3.7)
GLUCOSE SERPL-MCNC: 112 MG/DL (ref 60–99)
GLUCOSE URINE: ABNORMAL MG/DL
HDLC SERPL-MCNC: 40 MG/DL (ref 40–150)
HGB UR QL: ABNORMAL
KETONES UR QL: ABNORMAL MG/DL
LDLC SERPL CALC-MCNC: 101 MG/DL (ref 0–130)
LEUKOCYTE ESTERASE - QUEST: ABNORMAL
MISC.: ABNORMAL
NITRITE UR QL STRIP: ABNORMAL
PH UR STRIP: 7 PH (ref 5–7)
POTASSIUM SERPL-SCNC: 4.51 MMOL/L (ref 3.5–5.3)
PROT SERPL-MCNC: 7.4 G/DL (ref 6.1–8.1)
RBC, UR MICRO: ABNORMAL (ref ?–2)
SODIUM SERPL-SCNC: 139.3 MMOL/L (ref 135–146)
SP. GRAVITY: 1.02
TRIGL SERPL-MCNC: 203 MG/DL (ref 0–149)
UROBILINOGEN UR QL STRIP: 0.2 EU/DL (ref 0.2–1)
WBC, UR MICRO: ABNORMAL (ref ?–2)

## 2021-01-07 PROCEDURE — 81001 URINALYSIS AUTO W/SCOPE: CPT | Performed by: FAMILY MEDICINE

## 2021-01-07 PROCEDURE — 36415 COLL VENOUS BLD VENIPUNCTURE: CPT | Performed by: FAMILY MEDICINE

## 2021-01-07 PROCEDURE — 80053 COMPREHEN METABOLIC PANEL: CPT | Performed by: FAMILY MEDICINE

## 2021-01-07 PROCEDURE — 80061 LIPID PANEL: CPT | Performed by: FAMILY MEDICINE

## 2021-01-07 PROCEDURE — 99214 OFFICE O/P EST MOD 30 MIN: CPT | Performed by: FAMILY MEDICINE

## 2021-01-07 RX ORDER — LISINOPRIL AND HYDROCHLOROTHIAZIDE 12.5; 2 MG/1; MG/1
1 TABLET ORAL DAILY
Qty: 90 TABLET | Refills: 1 | Status: SHIPPED | OUTPATIENT
Start: 2021-01-07 | End: 2021-08-18

## 2021-01-07 RX ORDER — AMLODIPINE BESYLATE 5 MG/1
5 TABLET ORAL DAILY
Qty: 90 TABLET | Refills: 1 | Status: SHIPPED | OUTPATIENT
Start: 2021-01-07 | End: 2021-08-18

## 2021-01-07 RX ORDER — ATORVASTATIN CALCIUM 20 MG/1
20 TABLET, FILM COATED ORAL DAILY
Qty: 90 TABLET | Refills: 1 | Status: SHIPPED | OUTPATIENT
Start: 2021-01-07 | End: 2021-08-16

## 2021-01-07 ASSESSMENT — MIFFLIN-ST. JEOR: SCORE: 1907.35

## 2021-01-07 NOTE — PROGRESS NOTES
"SUBJECTIVE:   Rohan Ventura is a 50 year old male who complains of right low back pain for 2 weeks, positional with bending or lifting, without radiation down the legs. Precipitating factors: none recalled by the patient. Prior history of back problems: recurrent self limited episodes of low back pain in the past. There is no numbness in the legs.    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    Pt had covid last month- feels he has recovered    Pt taking no meds-mainly worried about kidneys given recent illness.  Pain only with movements    He denies rash    OBJECTIVE:  BP (!) 158/90 (BP Location: Right arm, Patient Position: Chair, Cuff Size: Adult Large)   Pulse 92   Temp 97.8  F (36.6  C)   Resp 20   Ht 1.772 m (5' 9.75\")   Wt 105.5 kg (232 lb 9.6 oz)   BMI 33.61 kg/m     Patient appears to be in mild pain, antalgic gait noted. Lumbosacral spine area reveals no local tenderness or mass-is mildly tender right paralumbar region, no rash.  Painful and reduced LS ROM noted. Straight leg raise is negative at 70 degrees on both sides. DTR's, motor strength and sensation normal, including heel and toe gait.  Peripheral pulses are palpable. X-Ray: not indicated.    ASSESSMENT:   lumbar strain-symptoms only with rotation to left    PLAN: Disc  For acute pain, rest, intermittent application of heat (do not sleep on heating pad) longer term treatment plan of prn NSAID's and discussed a home back care exercise program with flexion exercise routine. Proper lifting with avoidance of heavy lifting discussed. Consider Physical Therapy and XRay studies if not improving. Call or return to clinic prn if these symptoms worsen or fail to improve as anticipated.      Assessment & Plan     Back pain  As above  - HCL  Urinalysis, Routine (BFP)    Pure hypercholesterolemia  Control uncertain, needs recheck, continue current medications at current " "doses pendinglabs  - atorvastatin (LIPITOR) 20 MG tablet  Dispense: 90 tablet; Refill: 1  - Lipid Panel (BFP)  - Comprehensive Metobolic Panel (BFP)  - VENOUS COLLECTION    Essential hypertension, benign  Poorly controlled, discussed options , reviewed previous readings and home readings. Agree to add amlodipine, I reviewed the risks, benefits, and possible side effects of the medication.  The patient had an opportunity to ask any questions regarding the treatment plan. The patient was encouraged to call my office if any problems. Check blood pressure readings outside of the clinic several times per week, write down values, and follow up if elevated within the next several weeks. Blood pressure can be checked at the firestation, drugstore,  or any valid site.   - lisinopril-hydrochlorothiazide (ZESTORETIC) 20-12.5 MG tablet  Dispense: 90 tablet; Refill: 1  - amLODIPine (NORVASC) 5 MG tablet  Dispense: 90 tablet; Refill: 1  - Comprehensive Metobolic Panel (BFP)  - VENOUS COLLECTION    Pneumonia due to 2019 novel coronavirus  Resolved, do not feel kidneys damaged by covid as was his concern, renal function drawn as noted      Review of the result(s) of each unique test - labs today and UA      Reviewed hospital records from covis pneumonia, reviewed BP readings, reviewed labs, new meds as described             BMI:   Estimated body mass index is 33.61 kg/m  as calculated from the following:    Height as of this encounter: 1.772 m (5' 9.75\").    Weight as of this encounter: 105.5 kg (232 lb 9.6 oz).   Weight management plan: Discussed healthy diet and exercise guidelines      FUTURE APPOINTMENTS:       - Follow-up visit in 6 mo  Work on weight loss  Regular exercise    No follow-ups on file.    Ari Galo MD  [facility]    Subjective     Rohan Ventura is a 50 year old who presents to clinic today for the following health issues     HPI       Hyperlipidemia Follow-Up      Are you " "regularly taking any medication or supplement to lower your cholesterol?   Yes- atorvastatin    Are you having muscle aches or other side effects that you think could be caused by your cholesterol lowering medication?  No    Hypertension Follow-up      Do you check your blood pressure regularly outside of the clinic? No     Are you following a low salt diet? No    Are your blood pressures ever more than 140 on the top number (systolic) OR more   than 90 on the bottom number (diastolic), for example 140/90? Yes      How many servings of fruits and vegetables do you eat daily?  2-3    On average, how many sweetened beverages do you drink each day (Examples: soda, juice, sweet tea, etc.  Do NOT count diet or artificially sweetened beverages)?   1    How many days per week do you exercise enough to make your heart beat faster? 3 or less    How many minutes a day do you exercise enough to make your heart beat faster? 10 - 19    How many days per week do you miss taking your medication? 0        Review of Systems   Constitutional, HEENT, cardiovascular, pulmonary, gi and gu systems are negative, except as otherwise noted.      Objective    BP (!) 158/90 (BP Location: Right arm, Patient Position: Chair, Cuff Size: Adult Large)   Pulse 92   Temp 97.8  F (36.6  C)   Resp 20   Ht 1.772 m (5' 9.75\")   Wt 105.5 kg (232 lb 9.6 oz)   BMI 33.61 kg/m    Body mass index is 33.61 kg/m .  Physical Exam   GENERAL: healthy, alert and no distress  EYES: Eyes grossly normal to inspection, PERRL and conjunctivae and sclerae normal  HENT: ear canals and TM's normal, nose and mouth without ulcers or lesions  NECK: no adenopathy, no asymmetry, masses, or scars and thyroid normal to palpation  RESP: lungs clear to auscultation - no rales, rhonchi or wheezes  CV: regular rate and rhythm, normal S1 S2, no S3 or S4, no murmur, click or rub, no peripheral edema and peripheral pulses strong  ABDOMEN: soft, nontender, no hepatosplenomegaly, no " masses and bowel sounds normal  MS: no gross musculoskeletal defects noted, no edema  SKIN: no suspicious lesions or rashes  NEURO: Normal strength and tone, mentation intact and speech normal  PSYCH: mentation appears normal, affect normal/bright    Results for orders placed or performed in visit on 01/07/21   HCL  Urinalysis, Routine (BFP)     Status: Abnormal   Result Value Ref Range    Color Urine Yellow     Appearance Urine Clear     Glucose Urine Neg neg mg/dL    Bilirubin Urine Neg neg    Ketones Urine Neg neg mg/dL    Specific Gravity 1.020     Blood Urine Neg neg    pH Urine 7.0 5.0 - 7.0 pH    Albumin Urine neg neg - neg mg/dL    Urobilinogen Urine 0.2 0.2 - 1.0 EU/dL    Nitrite Urine Neg NEG    Leukocyte Esterase neg neg - neg    Wbc, Urine Micro 0-1 neg - 2    RBC Micro Urine 0-2 neg - 2    EP/HPF OCC     Bacteria Urine FEW (A) neg - neg    Casts/LPF neg     Miscellaneous neg    Lipid Panel (BFP)     Status: Abnormal   Result Value Ref Range    Cholesterol 182 0 - 199 mg/dL    Triglycerides 203 (A) 0 - 149 mg/dL    HDL Cholesterol 40 40 - 150 mg/dL    LDL Cholesterol Direct 101 0 - 130 mg/dL    Cholesterol/HDL Ratio 5 0 - 5   Comprehensive Metobolic Panel (BFP)     Status: Abnormal   Result Value Ref Range    Carbon Dioxide 30.3 20 - 32 mmol/L    Creatinine 1.05 0.70 - 1.18 mg/dL    Glucose 112 (A) 60 - 99 mg/dL    Sodium 139.3 135 - 146 mmol/L    Potassium 4.51 3.5 - 5.3 mmol/L    Chloride 100.4 98 - 110 mmol/L    Protein Total 7.4 6.1 - 8.1 g/dL    Albumin 4.5 3.6 - 5.1 g/dL    Alkaline Phosphatase 80 33 - 130 U/L    ALT 23 0 - 32 U/L    AST 15 0 - 35 U/L    Bilirubin Total 1.2 0.2 - 1.2 mg/dL    Urea Nitrogen 7 7 - 25 mg/dL    Calcium 9.8 8.6 - 10.3 mg/dL    BUN/Creatinine Ratio 6.7 6 - 22    Globulin Calculated 2.9 1.9 - 3.7    A/G Ratio 1.6 1 - 2.5

## 2021-01-15 ENCOUNTER — HEALTH MAINTENANCE LETTER (OUTPATIENT)
Age: 51
End: 2021-01-15

## 2021-08-15 DIAGNOSIS — I10 ESSENTIAL HYPERTENSION, BENIGN: ICD-10-CM

## 2021-08-16 DIAGNOSIS — E78.00 PURE HYPERCHOLESTEROLEMIA: ICD-10-CM

## 2021-08-16 RX ORDER — AMLODIPINE BESYLATE 5 MG/1
TABLET ORAL
Qty: 90 TABLET | Refills: 1 | COMMUNITY
Start: 2021-08-16

## 2021-08-16 RX ORDER — ATORVASTATIN CALCIUM 20 MG/1
TABLET, FILM COATED ORAL
Qty: 3 TABLET | Refills: 0 | COMMUNITY
Start: 2021-08-16 | End: 2021-08-18

## 2021-08-16 RX ORDER — LISINOPRIL AND HYDROCHLOROTHIAZIDE 12.5; 2 MG/1; MG/1
TABLET ORAL
Qty: 90 TABLET | Refills: 1 | COMMUNITY
Start: 2021-08-16

## 2021-08-16 NOTE — TELEPHONE ENCOUNTER
Rohan Ventura is requesting a refill of:    Signed Prescriptions:                        Disp   Refills    atorvastatin (LIPITOR) 20 MG tablet        3 tabl*0        Sig: TAKE 1 TABLET BY MOUTH EVERY DAY  Authorizing Provider: EB CARVAJAL  Ordering User: RENNY SIMPSON

## 2021-08-16 NOTE — TELEPHONE ENCOUNTER
Received incoming refill request for  Pending Prescriptions:                       Disp   Refills    lisinopril-hydrochlorothiazide (ZESTORETI*90 tab*1            Sig: TAKE 1 TABLET BY MOUTH EVERY DAY    amLODIPine (NORVASC) 5 MG tablet [Pharmac*90 tab*1            Sig: TAKE 1 TABLET BY MOUTH EVERY DAY    Patient last had a refill of these medications on 1/7/21 and has an appt 8/18, full refills will be sent in then.

## 2021-08-18 ENCOUNTER — OFFICE VISIT (OUTPATIENT)
Dept: FAMILY MEDICINE | Facility: CLINIC | Age: 51
End: 2021-08-18

## 2021-08-18 VITALS
WEIGHT: 235.4 LBS | TEMPERATURE: 98.1 F | HEART RATE: 92 BPM | SYSTOLIC BLOOD PRESSURE: 154 MMHG | HEIGHT: 70 IN | BODY MASS INDEX: 33.7 KG/M2 | DIASTOLIC BLOOD PRESSURE: 82 MMHG | RESPIRATION RATE: 20 BRPM

## 2021-08-18 DIAGNOSIS — Z71.89 ACP (ADVANCE CARE PLANNING): ICD-10-CM

## 2021-08-18 DIAGNOSIS — I10 ESSENTIAL HYPERTENSION, BENIGN: Primary | ICD-10-CM

## 2021-08-18 DIAGNOSIS — L82.0 INFLAMED SEBORRHEIC KERATOSIS: ICD-10-CM

## 2021-08-18 DIAGNOSIS — Z12.5 SPECIAL SCREENING FOR MALIGNANT NEOPLASM OF PROSTATE: ICD-10-CM

## 2021-08-18 DIAGNOSIS — E78.00 PURE HYPERCHOLESTEROLEMIA: ICD-10-CM

## 2021-08-18 DIAGNOSIS — L98.9 SKIN LESION: ICD-10-CM

## 2021-08-18 PROCEDURE — 80053 COMPREHEN METABOLIC PANEL: CPT | Performed by: FAMILY MEDICINE

## 2021-08-18 PROCEDURE — 84153 ASSAY OF PSA TOTAL: CPT | Mod: 90 | Performed by: FAMILY MEDICINE

## 2021-08-18 PROCEDURE — 99214 OFFICE O/P EST MOD 30 MIN: CPT | Performed by: FAMILY MEDICINE

## 2021-08-18 PROCEDURE — 36415 COLL VENOUS BLD VENIPUNCTURE: CPT | Performed by: FAMILY MEDICINE

## 2021-08-18 PROCEDURE — 80061 LIPID PANEL: CPT | Performed by: FAMILY MEDICINE

## 2021-08-18 RX ORDER — AMLODIPINE BESYLATE 5 MG/1
5 TABLET ORAL DAILY
Qty: 90 TABLET | Refills: 1 | Status: SHIPPED | OUTPATIENT
Start: 2021-08-18 | End: 2022-01-18

## 2021-08-18 RX ORDER — ATORVASTATIN CALCIUM 20 MG/1
20 TABLET, FILM COATED ORAL DAILY
Qty: 90 TABLET | Refills: 1 | Status: SHIPPED | OUTPATIENT
Start: 2021-08-18 | End: 2022-01-18

## 2021-08-18 RX ORDER — LISINOPRIL AND HYDROCHLOROTHIAZIDE 12.5; 2 MG/1; MG/1
1 TABLET ORAL DAILY
Qty: 90 TABLET | Refills: 1 | Status: SHIPPED | OUTPATIENT
Start: 2021-08-18 | End: 2022-01-18

## 2021-08-18 ASSESSMENT — MIFFLIN-ST. JEOR: SCORE: 1915.18

## 2021-08-18 NOTE — NURSING NOTE
Rohan Ventura is here for a medication check, mole and ankle issue.    Questioned patient about current smoking habits.  Pt. quit smoking some time ago.  PULSE regular  My Chart: active  CLASSIFICATION OF OVERWEIGHT AND OBESITY BY BMI                        Obesity Class           BMI(kg/m2)  Underweight                                    < 18.5  Normal                                         18.5-24.9  Overweight                                     25.0-29.9  OBESITY                     I                  30.0-34.9                             II                 35.0-39.9  EXTREME OBESITY             III                >40                            Patient's  BMI Body mass index is 34.01 kg/m .  http://hin.nhlbi.nih.gov/menuplanner/menu.cgi  Pre-visit planning  Immunizations - up to date  Colonoscopy - is up to date  Mammogram -   Asthma -   PHQ9 -    ALEJANDRO-7 -    Hearing Test -

## 2021-08-18 NOTE — PROGRESS NOTES
"    Assessment & Plan     Essential hypertension, benign  suboptimal control but he feels he is anxious here- agrees to start checking at home, Check blood pressure readings outside of the clinic several times per week, write down values, and follow up if elevated within the next several weeks. Blood pressure can be checked at the firestation, drugstore,  or any valid site.   - amLODIPine (NORVASC) 5 MG tablet  Dispense: 90 tablet; Refill: 1  - lisinopril-hydrochlorothiazide (ZESTORETIC) 20-12.5 MG tablet  Dispense: 90 tablet; Refill: 1    Pure hypercholesterolemia  Control uncertain, continue current medications at current doses pending labs  - atorvastatin (LIPITOR) 20 MG tablet  Dispense: 90 tablet; Refill: 1    Inflamed seborrheic keratosis  Discussed benign appearing lesion, f/u if not improving or worsening     ACP (advance care planning)      Special screening for malignant neoplasm of prostate      Skin lesion  Ankle cyst vs lipoma, f/u if worsening only    Consider derm skin survey this year      Review of external notes as documented elsewhere in note  Review of the result(s) of each unique test - labs  Ordering of each unique test  Prescription drug management         BMI:   Estimated body mass index is 34.01 kg/m  as calculated from the following:    Height as of this encounter: 1.772 m (5' 9.76\").    Weight as of this encounter: 106.8 kg (235 lb 6.4 oz).   Weight management plan: Discussed healthy diet and exercise guidelines    FUTURE APPOINTMENTS:       - Follow-up visit in 6 mo  Work on weight loss  Regular exercise    No follow-ups on file.    Ari Galo MD  [facility]    Maira Madrigal is a 51 year old who presents for the following health issues     HPI     Hyperlipidemia Follow-Up      Are you regularly taking any medication or supplement to lower your cholesterol?   Yes- atorvastatin    Are you having muscle aches or other side effects that you think could " "be caused by your cholesterol lowering medication?  No    Hypertension Follow-up      Do you check your blood pressure regularly outside of the clinic? No     Are you following a low salt diet? No    Are your blood pressures ever more than 140 on the top number (systolic) OR more   than 90 on the bottom number (diastolic), for example 140/90? No      How many servings of fruits and vegetables do you eat daily?  2-3    On average, how many sweetened beverages do you drink each day (Examples: soda, juice, sweet tea, etc.  Do NOT count diet or artificially sweetened beverages)?   1    How many days per week do you exercise enough to make your heart beat faster? 3 or less    How many minutes a day do you exercise enough to make your heart beat faster? 20 - 29    How many days per week do you miss taking your medication? 0    Pt had spot on scalp- same for 10 years, he picked at it and it is red now    Pt has lump in skin anterior left ankle for 2 years, no pain, no changes in 2 years      Review of Systems   Constitutional, HEENT, cardiovascular, pulmonary, gi and gu systems are negative, except as otherwise noted.      Objective    BP (!) 154/82 (BP Location: Right arm, Patient Position: Chair, Cuff Size: Adult Large)   Pulse 92   Temp 98.1  F (36.7  C)   Resp 20   Ht 1.772 m (5' 9.76\")   Wt 106.8 kg (235 lb 6.4 oz)   BMI 34.01 kg/m    Body mass index is 34.01 kg/m .  Physical Exam   GENERAL: healthy, alert and no distress  EYES: Eyes grossly normal to inspection, PERRL and conjunctivae and sclerae normal  HENT: ear canals and TM's normal, nose and mouth without ulcers or lesions  NECK: no adenopathy, no asymmetry, masses, or scars and thyroid normal to palpation  RESP: lungs clear to auscultation - no rales, rhonchi or wheezes  CV: regular rate and rhythm, normal S1 S2, no S3 or S4, no murmur, click or rub, no peripheral edema and peripheral pulses strong  ABDOMEN: soft, nontender, no hepatosplenomegaly, no " masses and bowel sounds normal  MS: no gross musculoskeletal defects noted, no edema  SKIN: scalp, inflamed seb K, left ankle with 1 cm cystic nodule vs lipoma, right shin insect bite  PSYCH: mentation appears normal, affect normal/bright    No results found for this or any previous visit (from the past 24 hour(s)).          '

## 2021-08-19 LAB
ALBUMIN SERPL-MCNC: 4.9 G/DL (ref 3.6–5.1)
ALBUMIN/GLOB SERPL: 1.8 {RATIO} (ref 1–2.5)
ALP SERPL-CCNC: 84 U/L (ref 33–130)
ALT 1742-6: 20 U/L (ref 0–32)
AST 1920-8: 20 U/L (ref 0–35)
BILIRUB SERPL-MCNC: 1.2 MG/DL (ref 0.2–1.2)
BUN SERPL-MCNC: 12 MG/DL (ref 7–25)
BUN/CREATININE RATIO: 9.7 (ref 6–22)
CALCIUM SERPL-MCNC: 9.6 MG/DL (ref 8.6–10.3)
CHLORIDE SERPLBLD-SCNC: 99.1 MMOL/L (ref 98–110)
CHOLEST SERPL-MCNC: 139 MG/DL (ref 0–199)
CHOLEST/HDLC SERPL: 3 {RATIO} (ref 0–5)
CO2 SERPL-SCNC: 22.3 MMOL/L (ref 20–32)
CREAT SERPL-MCNC: 1.24 MG/DL (ref 0.6–1.3)
GLOBULIN, CALCULATED - QUEST: 2.8 (ref 1.9–3.7)
GLUCOSE SERPL-MCNC: 96 MG/DL (ref 60–99)
HDLC SERPL-MCNC: 43 MG/DL (ref 40–150)
LDLC SERPL CALC-MCNC: 69 MG/DL (ref 0–130)
POTASSIUM SERPL-SCNC: 4.46 MMOL/L (ref 3.5–5.3)
PROT SERPL-MCNC: 7.7 G/DL (ref 6.1–8.1)
SODIUM SERPL-SCNC: 135.3 MMOL/L (ref 135–146)
TRIGL SERPL-MCNC: 137 MG/DL (ref 0–149)

## 2021-08-20 LAB — ABBOTT PSA - QUEST: 0.4 NG/ML

## 2021-09-04 ENCOUNTER — HEALTH MAINTENANCE LETTER (OUTPATIENT)
Age: 51
End: 2021-09-04

## 2021-11-18 ENCOUNTER — ALLIED HEALTH/NURSE VISIT (OUTPATIENT)
Dept: FAMILY MEDICINE | Facility: CLINIC | Age: 51
End: 2021-11-18

## 2021-11-18 DIAGNOSIS — Z23 NEED FOR VACCINATION: Primary | ICD-10-CM

## 2021-11-18 PROCEDURE — 90686 IIV4 VACC NO PRSV 0.5 ML IM: CPT | Performed by: PHYSICIAN ASSISTANT

## 2021-11-18 PROCEDURE — 90471 IMMUNIZATION ADMIN: CPT | Performed by: PHYSICIAN ASSISTANT

## 2022-01-18 ENCOUNTER — OFFICE VISIT (OUTPATIENT)
Dept: FAMILY MEDICINE | Facility: CLINIC | Age: 52
End: 2022-01-18

## 2022-01-18 VITALS
WEIGHT: 241 LBS | TEMPERATURE: 98.1 F | SYSTOLIC BLOOD PRESSURE: 148 MMHG | HEIGHT: 70 IN | BODY MASS INDEX: 34.5 KG/M2 | OXYGEN SATURATION: 97 % | RESPIRATION RATE: 20 BRPM | DIASTOLIC BLOOD PRESSURE: 86 MMHG | HEART RATE: 72 BPM

## 2022-01-18 DIAGNOSIS — I10 ESSENTIAL HYPERTENSION, BENIGN: ICD-10-CM

## 2022-01-18 DIAGNOSIS — E78.00 PURE HYPERCHOLESTEROLEMIA: ICD-10-CM

## 2022-01-18 DIAGNOSIS — R42 VERTIGO: Primary | ICD-10-CM

## 2022-01-18 LAB
ALBUMIN SERPL-MCNC: 4.7 G/DL (ref 3.6–5.1)
ALBUMIN/GLOB SERPL: 1.5 {RATIO} (ref 1–2.5)
ALP SERPL-CCNC: 88 U/L (ref 33–130)
ALT 1742-6: 31 U/L (ref 0–32)
AST 1920-8: 17 U/L (ref 0–35)
BILIRUB SERPL-MCNC: 1.2 MG/DL (ref 0.2–1.2)
BUN SERPL-MCNC: 11 MG/DL (ref 7–25)
BUN/CREATININE RATIO: 10.1 (ref 6–22)
CALCIUM SERPL-MCNC: 9.9 MG/DL (ref 8.6–10.3)
CHLORIDE SERPLBLD-SCNC: 100 MMOL/L (ref 98–110)
CHOLEST SERPL-MCNC: 143 MG/DL (ref 0–199)
CHOLEST/HDLC SERPL: 3 {RATIO} (ref 0–5)
CO2 SERPL-SCNC: 29.7 MMOL/L (ref 20–32)
CREAT SERPL-MCNC: 1.09 MG/DL (ref 0.6–1.3)
GLOBULIN, CALCULATED - QUEST: 3.2 (ref 1.9–3.7)
GLUCOSE SERPL-MCNC: 89 MG/DL (ref 60–99)
HDLC SERPL-MCNC: 42 MG/DL (ref 40–150)
LDLC SERPL CALC-MCNC: 73 MG/DL (ref 0–130)
POTASSIUM SERPL-SCNC: 5.17 MMOL/L (ref 3.5–5.3)
PROT SERPL-MCNC: 7.9 G/DL (ref 6.1–8.1)
SODIUM SERPL-SCNC: 138.9 MMOL/L (ref 135–146)
TRIGL SERPL-MCNC: 140 MG/DL (ref 0–149)

## 2022-01-18 PROCEDURE — 99214 OFFICE O/P EST MOD 30 MIN: CPT | Performed by: FAMILY MEDICINE

## 2022-01-18 PROCEDURE — 36415 COLL VENOUS BLD VENIPUNCTURE: CPT | Performed by: FAMILY MEDICINE

## 2022-01-18 PROCEDURE — 80053 COMPREHEN METABOLIC PANEL: CPT | Performed by: FAMILY MEDICINE

## 2022-01-18 PROCEDURE — 80061 LIPID PANEL: CPT | Performed by: FAMILY MEDICINE

## 2022-01-18 RX ORDER — ONDANSETRON 4 MG/1
4 TABLET, ORALLY DISINTEGRATING ORAL
COMMUNITY
Start: 2022-01-14 | End: 2022-04-07

## 2022-01-18 RX ORDER — ATORVASTATIN CALCIUM 20 MG/1
20 TABLET, FILM COATED ORAL DAILY
Qty: 90 TABLET | Refills: 1 | Status: SHIPPED | OUTPATIENT
Start: 2022-01-18 | End: 2022-09-08

## 2022-01-18 RX ORDER — MECLIZINE HYDROCHLORIDE 25 MG/1
25 TABLET ORAL
COMMUNITY
Start: 2022-01-14 | End: 2022-04-07

## 2022-01-18 RX ORDER — AMLODIPINE BESYLATE 5 MG/1
5 TABLET ORAL DAILY
Qty: 90 TABLET | Refills: 1 | Status: SHIPPED | OUTPATIENT
Start: 2022-01-18 | End: 2022-09-08

## 2022-01-18 RX ORDER — LISINOPRIL AND HYDROCHLOROTHIAZIDE 12.5; 2 MG/1; MG/1
1 TABLET ORAL DAILY
Qty: 90 TABLET | Refills: 1 | Status: SHIPPED | OUTPATIENT
Start: 2022-01-18 | End: 2022-09-08

## 2022-01-18 ASSESSMENT — MIFFLIN-ST. JEOR: SCORE: 1936.48

## 2022-01-18 NOTE — NURSING NOTE
Chief Complaint   Patient presents with     Hospital F/U     ER follow up, seen at [facility] ER on 1/14/22 for vertigo, doing better now, did have MRI of head and is in care everywhere to see      Recheck Medication     fasting medication check and review, did have one diet coke very early this morning but no food     Pre-visit Screening:  Immunizations:  up to date  Colonoscopy:  is up to date  Mammogram: NA  Asthma Action Test/Plan:  NA  PHQ9:  PHQ-2 done today   GAD7:  No concerns  Questioned patient about current smoking habits Pt. quit smoking some time ago.  Ok to leave detailed message on voice mail for today's visit only Yes, phone # [phone]

## 2022-01-18 NOTE — PROGRESS NOTES
Assessment & Plan     Vertigo  Resolved completely, suggests episode BPV, normal MRI and resolution of symptoms makes central cause unlikely    Essential hypertension, benign  suboptimal control, discussed options to add medication, he would like to start checking at home first    Check blood pressure readings outside of the clinic several times per week, write down values, and follow up if elevated within the next several weeks. Blood pressure can be checked at the firestation, drugstore,  or any valid site.     Pure hypercholesterolemia  Controlled based on last labs      Review of external notes as documented elsewhere in note  Review of the result(s) of each unique test - labs  Ordering of each unique test  Prescription drug management         FUTURE APPOINTMENTS:       - Follow-up visit in 6 mo  Work on weight loss  Regular exercise    No follow-ups on file.    Ari Galo MD  [facility]    Subjective   Rohan is a 51 year old who presents for the following health issues     HPI     ED/UC Followup:    Facility:  [facility] ER  Date of visit: 1/14/22, notes reviewed, had MRI  Reason for visit: Acute severe vertigo  Current Status: Doing much better, not having any dizziness anymore-only had to take the meclizine once the next day       Hyperlipidemia Follow-Up      Are you regularly taking any medication or supplement to lower your cholesterol?   Yes- atorvastatin    Are you having muscle aches or other side effects that you think could be caused by your cholesterol lowering medication?  No    Hypertension [id number]-[id number] not been checking at home, did not take meds yet today      Do you check your blood pressure regularly outside of the clinic? No     Are you following a low salt diet? No    Are your blood pressures ever more than 140 on the top number (systolic) OR more   than 90 on the bottom number (diastolic), for example 140/90? No      How many servings of fruits  and vegetables do you eat daily?  2-3    On average, how many sweetened beverages do you drink each day (Examples: soda, juice, sweet tea, etc.  Do NOT count diet or artificially sweetened beverages)?   1    How many days per week do you exercise enough to make your heart beat faster? Work related only    How many minutes a day do you exercise enough to make your heart beat faster? Much of work day    How many days per week do you miss taking your medication? 0      Review of Systems   Constitutional, HEENT, cardiovascular, pulmonary, gi and gu systems are negative, except as otherwise noted.      Objective    There were no vitals taken for this visit.  There is no height or weight on file to calculate BMI.  Physical Exam   GENERAL: healthy, alert and no distress  EYES: Eyes grossly normal to inspection, PERRL and conjunctivae and sclerae normal  HENT: ear canals and TM's normal, nose and mouth without ulcers or lesions  NECK: no adenopathy, no asymmetry, masses, or scars and thyroid normal to palpation  RESP: lungs clear to auscultation - no rales, rhonchi or wheezes  CV: regular rate and rhythm, normal S1 S2, no S3 or S4, no murmur, click or rub, no peripheral edema and peripheral pulses strong  ABDOMEN: soft, nontender, no hepatosplenomegaly, no masses and bowel sounds normal  MS: no gross musculoskeletal defects noted, no edema  NEURO: Normal strength and tone, mentation intact and speech normal  PSYCH: mentation appears normal, affect normal/bright    Office Visit on 08/18/2021   Component Date Value Ref Range Status     Cholesterol 08/19/2021 139  0 - 199 mg/dL Final     Triglycerides 08/19/2021 137  0 - 149 mg/dL Final     HDL Cholesterol 08/19/2021 43  40 - 150 mg/dL Final     LDL Cholesterol Direct 08/19/2021 69  0 - 130 mg/dL Final     Cholesterol/HDL Ratio 08/19/2021 3  0 - 5 Final     Carbon Dioxide 08/18/2021 22.3  20 - 32 mmol/L Final     Creatinine 08/18/2021 1.24  0.60 - 1.30 mg/dL Final     Glucose  08/18/2021 96  60 - 99 mg/dL Final     Sodium 08/18/2021 135.3  135 - 146 mmol/L Final     Potassium 08/18/2021 4.46  3.5 - 5.3 mmol/L Final     Chloride 08/18/2021 99.1  98 - 110 mmol/L Final     Protein Total 08/18/2021 7.7  6.1 - 8.1 g/dL Final     Albumin 08/18/2021 4.9  3.6 - 5.1 g/dL Final     Alkaline Phosphatase 08/18/2021 84  33 - 130 U/L Final     ALT 08/18/2021 20  0 - 32 U/L Final     AST 08/18/2021 20  0 - 35 U/L Final     Bilirubin Total 08/18/2021 1.2  0.2 - 1.2 mg/dL Final     Urea Nitrogen 08/18/2021 12  7 - 25 mg/dL Final     Calcium 08/18/2021 9.6  8.6 - 10.3 mg/dL Final     BUN/Creatinine Ratio 08/18/2021 9.7  6 - 22 Final     Globulin Calculated 08/18/2021 2.8  1.9 - 3.7 Final     A/G Ratio 08/18/2021 1.8  1 - 2.5 Final     Abbott PSA 08/18/2021 0.4  < OR = 4.0 ng/mL Final    Comment: The total PSA value from this assay system is   standardized against the WHO standard. The test   result will be approximately 20% lower when compared   to the equimolar-standardized total PSA ([place]). Comparison of serial PSA results should be   interpreted with this fact in mind.     This test was performed using the Siemens   chemiluminescent method. Values obtained from   different assay methods cannot be used  interchangeably. PSA levels, regardless of  value, should not be interpreted as absolute  evidence of the presence or absence of disease.

## 2022-02-19 ENCOUNTER — HEALTH MAINTENANCE LETTER (OUTPATIENT)
Age: 52
End: 2022-02-19

## 2022-04-07 ENCOUNTER — OFFICE VISIT (OUTPATIENT)
Dept: FAMILY MEDICINE | Facility: CLINIC | Age: 52
End: 2022-04-07

## 2022-04-07 VITALS
SYSTOLIC BLOOD PRESSURE: 146 MMHG | WEIGHT: 239.2 LBS | TEMPERATURE: 98.2 F | RESPIRATION RATE: 20 BRPM | BODY MASS INDEX: 34.24 KG/M2 | HEIGHT: 70 IN | DIASTOLIC BLOOD PRESSURE: 82 MMHG | HEART RATE: 80 BPM

## 2022-04-07 DIAGNOSIS — R36.1 HEMATOSPERMIA: ICD-10-CM

## 2022-04-07 DIAGNOSIS — R30.0 DYSURIA: Primary | ICD-10-CM

## 2022-04-07 LAB
APPEARANCE UR: CLEAR
BACTERIA, UR: ABNORMAL
BILIRUB UR QL: ABNORMAL
CASTS/LPF: ABNORMAL
COLOR UR: YELLOW
EP/HPF: ABNORMAL
GLUCOSE URINE: ABNORMAL MG/DL
HGB UR QL: ABNORMAL
KETONES UR QL: ABNORMAL MG/DL
MISC.: ABNORMAL
NITRITE UR QL STRIP: ABNORMAL
PH UR STRIP: 6 PH (ref 5–7)
PROT UR QL: ABNORMAL MG/DL
RBC, UR MICRO: ABNORMAL (ref ?–2)
SP GR UR STRIP: 1.01 (ref 1–1.03)
UROBILINOGEN UR QL STRIP: 0.2 EU/DL (ref 0.2–1)
WBC #/AREA URNS HPF: ABNORMAL /[HPF]
WBC, UR MICRO: ABNORMAL (ref ?–2)

## 2022-04-07 PROCEDURE — 36415 COLL VENOUS BLD VENIPUNCTURE: CPT | Performed by: FAMILY MEDICINE

## 2022-04-07 PROCEDURE — 81001 URINALYSIS AUTO W/SCOPE: CPT | Performed by: FAMILY MEDICINE

## 2022-04-07 PROCEDURE — 84153 ASSAY OF PSA TOTAL: CPT | Mod: 90 | Performed by: FAMILY MEDICINE

## 2022-04-07 PROCEDURE — 99213 OFFICE O/P EST LOW 20 MIN: CPT | Performed by: FAMILY MEDICINE

## 2022-04-07 NOTE — NURSING NOTE
"Rohan Ventura is here for \"plumbing issues\" that started today.    Questioned patient about current smoking habits.  Pt. quit smoking some time ago.  PULSE regular  My Chart: active  CLASSIFICATION OF OVERWEIGHT AND OBESITY BY BMI                        Obesity Class           BMI(kg/m2)  Underweight                                    < 18.5  Normal                                         18.5-24.9  Overweight                                     25.0-29.9  OBESITY                     I                  30.0-34.9                             II                 35.0-39.9  EXTREME OBESITY             III                >40                            Patient's  BMI Body mass index is 34.82 kg/m .  http://hin.nhlbi.nih.gov/menuplanner/menu.cgi  Pre-visit planning  Immunizations - up to date  Colonoscopy - is up to date  Mammogram -   Asthma -   PHQ9 -    ALEJANDRO-7 -      "

## 2022-04-07 NOTE — PROGRESS NOTES
SUBJECTIVE: Rohan Ventura is a 52 year old male who complains of blood in ejaculate today after am intercourse, no pain, no flank pain, no fever,no chills. NO sex toys used    Patient Active Problem List   Diagnosis     Obesity     Labyrinthitis     Essential hypertension, benign     Pure hypercholesterolemia     ACP (advance care planning)     Abnormal glucose     Health Care Home     Past Medical History:   Diagnosis Date     Panic disorder without agoraphobia      Family History   Problem Relation Age of Onset     C.A.D. Father          age 58     Hypertension Father      Hypertension Mother      Diabetes Mother      Lipids Mother      Osteoporosis Mother          age 80 after hip fracture     Hypertension Maternal Grandfather      Gastrointestinal Disease Sister         Crohns     Gastrointestinal Disease Sister         Crohns     Social History     Socioeconomic History     Marital status:      Spouse name: Not on file     Number of children: Not on file     Years of education: Not on file     Highest education level: Not on file   Occupational History     Occupation: repair   Tobacco Use     Smoking status: Former Smoker     Quit date: 1994     Years since quittin.2     Smokeless tobacco: Never Used   Substance and Sexual Activity     Alcohol use: Yes     Alcohol/week: 0.0 standard drinks     Drug use: No     Sexual activity: Yes   Other Topics Concern      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, [employer] , infantry      Blood Transfusions No     Caffeine Concern No     Occupational Exposure No     Hobby Hazards No     Sleep Concern No     Stress Concern No     Weight Concern No     Special Diet No     Back Care No     Exercise No     Bike Helmet No     Seat Belt Yes     Self-Exams Not Asked   Social History Narrative     Not on file     Social Determinants of Health     Financial Resource Strain: Not on file   Food Insecurity: Not on file   Transportation Needs: Not on  "file   Physical Activity: Not on file   Stress: Not on file   Social Connections: Not on file   Intimate Partner Violence: Not on file   Housing Stability: Not on file     Past Surgical History:   Procedure Laterality Date     [facility] NONSPECIFIC PROCEDURE  1997    LUNG ABSCESS SURGERY-empyema ; dr Mendes     amLODIPine (NORVASC) 5 MG tablet, Take 1 tablet (5 mg) by mouth daily  atorvastatin (LIPITOR) 20 MG tablet, Take 1 tablet (20 mg) by mouth daily  lisinopril-hydrochlorothiazide (ZESTORETIC) 20-12.5 MG tablet, Take 1 tablet by mouth daily  multivitamin, therapeutic with minerals (MULTI-VITAMIN) TABS, Take 1 tablet by mouth daily    No current facility-administered medications on file prior to visit.       Allergies: No known drug allergies    Immunization History   Administered Date(s) Administered     COVID-19,PF,Pfizer (12+ Yrs) 04/14/2021, 05/04/2021, 11/11/2021     HepA-Adult 01/18/2019     Influenza (IIV3) PF 09/16/2010, 09/24/2011, 09/01/2012, 09/09/2013, 09/16/2014     Influenza Vaccine IM > 6 months Valent IIV4 (Alfuria,Fluzone) 02/08/2016, 01/18/2019, 11/30/2020, 11/18/2021     Influenza Vaccine, 6+MO IM (QUADRIVALENT W/PRESERVATIVES) 10/16/2019     TD (ADULT, 7+) 04/29/2003     TDAP Vaccine (Boostrix) 09/01/2012        OBJECTIVE:BP (!) 146/82 (BP Location: Left arm, Patient Position: Chair, Cuff Size: Adult Large)   Pulse 80   Temp 98.2  F (36.8  C)   Resp 20   Ht 1.765 m (5' 9.5\")   Wt 108.5 kg (239 lb 3.2 oz)   BMI 34.82 kg/m    Appears well, in no apparent distress.  Vital signs are normal. The abdomen is soft without tenderness, guarding, mass, rebound or organomegaly. No CVA tenderness or inguinal adenopathy noted. Urine dipstick shows negative for all components.  Micro exam: negative for WBC's or RBC's.     Genitals normal; both testes normal without tenderness, masses, hydroceles, varicoceles, erythema or swelling. Shaft normal, circumcised, meatus normal without discharge. No inguinal hernia " noted. No inguinal lymphadenopathy.    ASSESSMENT:       (R36.1) Hematospermia  Comment: discussed likely benign issue, no worrisome symptoms , urine clear  Plan: we will check PSA at pt request, if symptoms resolve as expected no further valuation, urology if symptoms persist for a month

## 2022-04-08 LAB — ABBOTT PSA - QUEST: 0.47 NG/ML

## 2022-04-27 ENCOUNTER — OFFICE VISIT (OUTPATIENT)
Dept: FAMILY MEDICINE | Facility: CLINIC | Age: 52
End: 2022-04-27

## 2022-04-27 VITALS
BODY MASS INDEX: 33.76 KG/M2 | HEIGHT: 70 IN | DIASTOLIC BLOOD PRESSURE: 80 MMHG | RESPIRATION RATE: 20 BRPM | HEART RATE: 84 BPM | TEMPERATURE: 97.7 F | WEIGHT: 235.8 LBS | SYSTOLIC BLOOD PRESSURE: 142 MMHG

## 2022-04-27 DIAGNOSIS — R36.1 HEMATOSPERMIA: Primary | ICD-10-CM

## 2022-04-27 PROCEDURE — 99213 OFFICE O/P EST LOW 20 MIN: CPT | Performed by: FAMILY MEDICINE

## 2022-04-27 PROCEDURE — 87591 N.GONORRHOEAE DNA AMP PROB: CPT | Mod: 90 | Performed by: FAMILY MEDICINE

## 2022-04-27 PROCEDURE — 87491 CHLMYD TRACH DNA AMP PROBE: CPT | Mod: 90 | Performed by: FAMILY MEDICINE

## 2022-04-27 NOTE — PROGRESS NOTES
SUBJECTIVE: Rohan Ventura is a 52 year old male who presents for persistent hematospermia - states he still sees dark red color/dark brown color with every ejaculation.  This has been occurring for 3 weeks now-no pain, no dysuria, no blood or discoloration in urine.  No fevers.  No trauma. No hx prostate bx/manipulation    He was seen here 22-Ua with micro and PSA wnl    NO FH bladder, penile CA    Patient Active Problem List   Diagnosis     Obesity     Labyrinthitis     Essential hypertension, benign     Pure hypercholesterolemia     ACP (advance care planning)     Abnormal glucose     Health Care Home     Past Medical History:   Diagnosis Date     Panic disorder without agoraphobia      Family History   Problem Relation Age of Onset     C.A.D. Father          age 58     Hypertension Father      Hypertension Mother      Diabetes Mother      Lipids Mother      Osteoporosis Mother          age 80 after hip fracture     Hypertension Maternal Grandfather      Gastrointestinal Disease Sister         Crohns     Gastrointestinal Disease Sister         Crohns     Social History     Socioeconomic History     Marital status:      Spouse name: Not on file     Number of children: Not on file     Years of education: Not on file     Highest education level: Not on file   Occupational History     Occupation: repair   Tobacco Use     Smoking status: Former Smoker     Quit date: 1994     Years since quittin.3     Smokeless tobacco: Never Used   Substance and Sexual Activity     Alcohol use: Yes     Alcohol/week: 0.0 standard drinks     Drug use: No     Sexual activity: Yes   Other Topics Concern      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, [employer] , infantry      Blood Transfusions No     Caffeine Concern No     Occupational Exposure No     Hobby Hazards No     Sleep Concern No     Stress Concern No     Weight Concern No     Special Diet No     Back Care No     Exercise No     Bike Helmet  "No     Seat Belt Yes     Self-Exams Not Asked   Social History Narrative     Not on file     Social Determinants of Health     Financial Resource Strain: Not on file   Food Insecurity: Not on file   Transportation Needs: Not on file   Physical Activity: Not on file   Stress: Not on file   Social Connections: Not on file   Intimate Partner Violence: Not on file   Housing Stability: Not on file     Past Surgical History:   Procedure Laterality Date     [facility] NONSPECIFIC PROCEDURE  1997    LUNG ABSCESS SURGERY-empyema ; dr Mendes     amLODIPine (NORVASC) 5 MG tablet, Take 1 tablet (5 mg) by mouth daily  atorvastatin (LIPITOR) 20 MG tablet, Take 1 tablet (20 mg) by mouth daily  lisinopril-hydrochlorothiazide (ZESTORETIC) 20-12.5 MG tablet, Take 1 tablet by mouth daily  multivitamin, therapeutic with minerals (MULTI-VITAMIN) TABS, Take 1 tablet by mouth daily    No current facility-administered medications on file prior to visit.       Allergies: No known drug allergies    Immunization History   Administered Date(s) Administered     COVID-19,PF,Pfizer (12+ Yrs) 04/14/2021, 05/04/2021, 11/11/2021     HepA-Adult 01/18/2019     Influenza (IIV3) PF 09/16/2010, 09/24/2011, 09/01/2012, 09/09/2013, 09/16/2014     Influenza Vaccine IM > 6 months Valent IIV4 (Alfuria,Fluzone) 02/08/2016, 01/18/2019, 11/30/2020, 11/18/2021     Influenza Vaccine, 6+MO IM (QUADRIVALENT W/PRESERVATIVES) 10/16/2019     TD (ADULT, 7+) 04/29/2003     TDAP Vaccine (Boostrix) 09/01/2012        OBJECTIVE: BP (!) 142/80 (BP Location: Left arm, Patient Position: Chair, Cuff Size: Adult Large)   Pulse 84   Temp 97.7  F (36.5  C)   Resp 20   Ht 1.765 m (5' 9.5\")   Wt 107 kg (235 lb 12.8 oz)   BMI 34.32 kg/m     The abdomen is soft without tenderness, guarding, mass, rebound or organomegaly. Bowel sounds are normal. No CVA tenderness or inguinal adenopathy noted.     ASSESSMENT: /PLAN:   (R36.1) Hematospermia  (primary encounter diagnosis)  Comment: again " discussed high likelihood of benign issue-pt worried about cancer primarily.  We discussed recommend steps per UTD for this issue- will check Gc/Chlam although low risk, will refer to urology.    Plan: Chlamydia Trach/N. Gonorrhoeae RNA TMA(Pap,         Swab,Urine)([facility])        Urology referral, can cancer if symptoms resolve before appt    Pt is reassured and understand plan

## 2022-04-27 NOTE — NURSING NOTE
Rohan Ventura is here for a recheck from last visit.    Questioned patient about current smoking habits.  Pt. quit smoking some time ago.  PULSE regular  My Chart: active  CLASSIFICATION OF OVERWEIGHT AND OBESITY BY BMI                        Obesity Class           BMI(kg/m2)  Underweight                                    < 18.5  Normal                                         18.5-24.9  Overweight                                     25.0-29.9  OBESITY                     I                  30.0-34.9                             II                 35.0-39.9  EXTREME OBESITY             III                >40                            Patient's  BMI Body mass index is 34.32 kg/m .  http://hin.nhlbi.nih.gov/menuplanner/menu.cgi  Pre-visit planning  Immunizations - up to date  Colonoscopy -   Mammogram -   Asthma -   PHQ9 -    ALEJANDRO-7 -

## 2022-04-28 LAB
CHLAMYDIA TRACHOMATIS RNA, TMA - QUEST: NOT DETECTED
NEISSERIA GONORRHOEAE RNA TMA: NOT DETECTED
SEE NOTE - QUEST: NORMAL

## 2022-09-08 ENCOUNTER — OFFICE VISIT (OUTPATIENT)
Dept: FAMILY MEDICINE | Facility: CLINIC | Age: 52
End: 2022-09-08

## 2022-09-08 VITALS
HEART RATE: 77 BPM | TEMPERATURE: 98.4 F | OXYGEN SATURATION: 97 % | WEIGHT: 227.2 LBS | DIASTOLIC BLOOD PRESSURE: 82 MMHG | HEIGHT: 70 IN | BODY MASS INDEX: 32.53 KG/M2 | SYSTOLIC BLOOD PRESSURE: 134 MMHG

## 2022-09-08 DIAGNOSIS — Z23 NEED FOR TETANUS BOOSTER: ICD-10-CM

## 2022-09-08 DIAGNOSIS — R42 VERTIGO: Primary | ICD-10-CM

## 2022-09-08 DIAGNOSIS — E78.00 PURE HYPERCHOLESTEROLEMIA: ICD-10-CM

## 2022-09-08 DIAGNOSIS — I10 ESSENTIAL HYPERTENSION, BENIGN: ICD-10-CM

## 2022-09-08 LAB
% GRANULOCYTES: 69.4 %
ALBUMIN SERPL-MCNC: 4.5 G/DL (ref 3.6–5.1)
ALBUMIN/GLOB SERPL: 1.8 {RATIO} (ref 1–2.5)
ALP SERPL-CCNC: 71 U/L (ref 33–130)
ALT 1742-6: 22 U/L (ref 0–32)
AST 1920-8: 19 U/L (ref 0–35)
BILIRUB SERPL-MCNC: 0.7 MG/DL (ref 0.2–1.2)
BUN SERPL-MCNC: 13 MG/DL (ref 7–25)
BUN/CREATININE RATIO: 11.2 (ref 6–22)
CALCIUM SERPL-MCNC: 9.4 MG/DL (ref 8.6–10.3)
CHLORIDE SERPLBLD-SCNC: 102.1 MMOL/L (ref 98–110)
CHOLEST SERPL-MCNC: 121 MG/DL (ref 0–199)
CHOLEST/HDLC SERPL: 3 {RATIO} (ref 0–5)
CO2 SERPL-SCNC: 30.7 MMOL/L (ref 20–32)
CREAT SERPL-MCNC: 1.16 MG/DL (ref 0.6–1.3)
GLOBULIN, CALCULATED - QUEST: 2.5 (ref 1.9–3.7)
GLUCOSE SERPL-MCNC: 105 MG/DL (ref 60–99)
HCT VFR BLD AUTO: 43.3 % (ref 40–53)
HDLC SERPL-MCNC: 40 MG/DL (ref 40–150)
HEMOGLOBIN: 14.7 G/DL (ref 13.3–17.7)
LDLC SERPL CALC-MCNC: 67 MG/DL (ref 0–130)
LYMPHOCYTES NFR BLD AUTO: 24.5 %
MCH RBC QN AUTO: 31.1 PG (ref 26–33)
MCHC RBC AUTO-ENTMCNC: 33.9 G/DL (ref 31–36)
MCV RBC AUTO: 91.6 FL (ref 78–100)
MONOCYTES NFR BLD AUTO: 6.1 %
PLATELET COUNT - QUEST: 320 10^9/L (ref 150–375)
POTASSIUM SERPL-SCNC: 4.81 MMOL/L (ref 3.5–5.3)
PROT SERPL-MCNC: 7 G/DL (ref 6.1–8.1)
RBC # BLD AUTO: 4.73 10*12/L (ref 4.4–5.9)
SODIUM SERPL-SCNC: 139.3 MMOL/L (ref 135–146)
TRIGL SERPL-MCNC: 70 MG/DL (ref 0–149)
WBC # BLD AUTO: 9.5 10*9/L (ref 4–11)

## 2022-09-08 PROCEDURE — 80053 COMPREHEN METABOLIC PANEL: CPT | Performed by: FAMILY MEDICINE

## 2022-09-08 PROCEDURE — 80061 LIPID PANEL: CPT | Performed by: FAMILY MEDICINE

## 2022-09-08 PROCEDURE — 90714 TD VACC NO PRESV 7 YRS+ IM: CPT | Performed by: FAMILY MEDICINE

## 2022-09-08 PROCEDURE — 36415 COLL VENOUS BLD VENIPUNCTURE: CPT | Performed by: FAMILY MEDICINE

## 2022-09-08 PROCEDURE — 99214 OFFICE O/P EST MOD 30 MIN: CPT | Mod: 25 | Performed by: FAMILY MEDICINE

## 2022-09-08 PROCEDURE — 85025 COMPLETE CBC W/AUTO DIFF WBC: CPT | Performed by: FAMILY MEDICINE

## 2022-09-08 PROCEDURE — 90471 IMMUNIZATION ADMIN: CPT | Performed by: FAMILY MEDICINE

## 2022-09-08 RX ORDER — LISINOPRIL AND HYDROCHLOROTHIAZIDE 12.5; 2 MG/1; MG/1
1 TABLET ORAL DAILY
Qty: 90 TABLET | Refills: 1 | Status: SHIPPED | OUTPATIENT
Start: 2022-09-08 | End: 2023-01-12

## 2022-09-08 RX ORDER — AMLODIPINE BESYLATE 5 MG/1
5 TABLET ORAL DAILY
Qty: 90 TABLET | Refills: 1 | Status: SHIPPED | OUTPATIENT
Start: 2022-09-08 | End: 2023-01-12

## 2022-09-08 RX ORDER — ATORVASTATIN CALCIUM 20 MG/1
20 TABLET, FILM COATED ORAL DAILY
Qty: 90 TABLET | Refills: 1 | Status: SHIPPED | OUTPATIENT
Start: 2022-09-08 | End: 2023-01-12

## 2022-09-08 NOTE — PROGRESS NOTES
"  Assessment & Plan     Vertigo  Pt with persistent intermittent episodes of vertigo- sounds positional in general, discussed option, given ongoing symptoms recommend ENT evaluation, also consider NDBC    No current symptoms to suggest central cause  - Adult ENT  Referral  - HEMOGRAM PLATELET DIFF (BFP)  - VENOUS COLLECTION    Essential hypertension, benign  Well controlled, continue current medications at current doses   - Comprehensive Metobolic Panel (BFP)  - VENOUS COLLECTION  - amLODIPine (NORVASC) 5 MG tablet  Dispense: 90 tablet; Refill: 1  - lisinopril-hydrochlorothiazide (ZESTORETIC) 20-12.5 MG tablet  Dispense: 90 tablet; Refill: 1    Need for tetanus booster    - TD, PF, 7 + YRS    Pure hypercholesterolemia  Control uncertain, continue current medications at current doses pending ;abs  - Lipid Panel (BFP)  - Comprehensive Metobolic Panel (BFP)  - VENOUS COLLECTION  - atorvastatin (LIPITOR) 20 MG tablet  Dispense: 90 tablet; Refill: 1      Review of external notes as documented elsewhere in note  Review of the result(s) of each unique test - labs  Ordering of each unique test  Prescription drug management         BMI:   Estimated body mass index is 33.07 kg/m  as calculated from the following:    Height as of this encounter: 1.765 m (5' 9.5\").    Weight as of this encounter: 103.1 kg (227 lb 3.2 oz).   Weight management plan: Discussed healthy diet and exercise guidelines    FUTURE APPOINTMENTS:       - Follow-up visit in 6 mo  Work on weight loss  Regular exercise    No follow-ups on file.    Ari Galo MD  [facility]    Maira Madrigal is a 52 year old, presenting for the following health issues:  Vertigo (Discuss ongoing vertigo, becoming more frequent )      HPI     Dizziness-only when moving  Onset/Duration: 1/22 had acute episode iagnosed as BPV in ED, resolved for months but recurrence of above symptoms on and off over last 2 months  Description: -feels " "like someone shook his head, balance off when walking  Do you feel faint: No  Does it feel like the surroundings (bed, room) are moving: YES  Unsteady/off balance: YES  Have you passed out or fallen: No  Intensity: mild  Progression of Symptoms: intermittent episodes , less severe than in January, episodes can last an hour or so  Accompanying Signs & Symptoms:  Heart palpitations or chest pain: No  Nausea, vomiting: YES-mild nausea  Weakness or lack of coordination in arms or legs: No  Vision or speech changes: No  Numbness or tingling: No  Ringing in ears (Tinnitus): not ringing but some \"white noise sounds and pressure\" with episodes  Hearing Loss: No  History:   Head trauma/concussion history: No  Previous similar symptoms: as above  Recent bleeding history: No  Any new medications (BP?): only new med metamucil  Precipitating factors:   Worse with activity: YES  Worse with head movement: No  Alleviating factors:   Does staying in a fixed position give relief: YES  Therapies tried and outcome: meclizine helps    Hypertension Follow-up      Do you check your blood pressure regularly outside of the clinic? No     Are you following a low salt diet? No    Are your blood pressures ever more than 140 on the top number (systolic) OR more   than 90 on the bottom number (diastolic), for example 140/90? No      How many servings of fruits and vegetables do you eat daily?  2-3    On average, how many sweetened beverages do you drink each day (Examples: soda, juice, sweet tea, etc.  Do NOT count diet or artificially sweetened beverages)?   1    How many days per week do you exercise enough to make your heart beat faster? Mainly work    How many minutes a day do you exercise enough to make your heart beat faster?     How many days per week do you miss taking your medication? 0      Review of Systems   Constitutional, HEENT, cardiovascular, pulmonary, gi and gu systems are negative, except as otherwise noted.      Objective  " "  /82 (BP Location: Right arm, Patient Position: Sitting, Cuff Size: Adult Large)   Pulse 77   Temp 98.4  F (36.9  C) (Temporal)   Ht 1.765 m (5' 9.5\")   Wt 103.1 kg (227 lb 3.2 oz)   SpO2 97%   BMI 33.07 kg/m    Body mass index is 33.07 kg/m .  Physical Exam   GENERAL: healthy, alert and no distress  EYES: Eyes grossly normal to inspection, PERRL and conjunctivae and sclerae normal  HENT: ear canals and TM's normal, nose and mouth without ulcers or lesions  NECK: no adenopathy, no asymmetry, masses, or scars and thyroid normal to palpation  RESP: lungs clear to auscultation - no rales, rhonchi or wheezes  CV: regular rate and rhythm, normal S1 S2, no S3 or S4, no murmur, click or rub, no peripheral edema and peripheral pulses strong  ABDOMEN: soft, nontender, no hepatosplenomegaly, no masses and bowel sounds normal  MS: no gross musculoskeletal defects noted, no edema  NEURO: Normal strength and tone, mentation intact and speech normal  NEURO: cranial nerves 2-12 intact  PSYCH: mentation appears normal, affect normal/bright    Office Visit on 04/27/2022   Component Date Value Ref Range Status     Chlamydia Trachomatis RNA TMA 04/27/2022 NOT DETECTED  NOT DETECTED Final     Neisseria Gonorrhoeae RNA TMA 04/27/2022 NOT DETECTED  NOT DETECTED Final     See Note 04/27/2022 SEE COMMENT   Final    Comment: The analytical performance characteristics of this  assay, when used to test SurePath(TM) specimens have been  determined by [employer]. The modifications have  not been cleared or approved by the FDA. This assay has  been validated pursuant to the CLIA regulations and is  used for clinical purposes.     For additional information, please refer to  https://education.[employer].DocASAP/faq/ESR884  (This link is being provided for information/  educational purposes only.)                            "

## 2022-09-08 NOTE — NURSING NOTE
Chief Complaint   Patient presents with     Vertigo     Discuss ongoing vertigo, becoming more frequent      Pre-visit Screening:  Immunizations:  not up to date - shingrix at pharmacy, td today  Colonoscopy:  is up to date  Mammogram: NA  Asthma Action Test/Plan:  NA  PHQ9:  NA  GAD7:  NA  Questioned patient about current smoking habits Pt. quit smoking some time ago.  Ok to leave detailed message on voice mail for today's visit only Yes, phone # [phone]

## 2022-10-16 ENCOUNTER — HEALTH MAINTENANCE LETTER (OUTPATIENT)
Age: 52
End: 2022-10-16

## 2023-01-12 ENCOUNTER — OFFICE VISIT (OUTPATIENT)
Dept: FAMILY MEDICINE | Facility: CLINIC | Age: 53
End: 2023-01-12

## 2023-01-12 VITALS
HEIGHT: 70 IN | RESPIRATION RATE: 20 BRPM | BODY MASS INDEX: 31.5 KG/M2 | WEIGHT: 220 LBS | TEMPERATURE: 97.2 F | SYSTOLIC BLOOD PRESSURE: 128 MMHG | HEART RATE: 69 BPM | DIASTOLIC BLOOD PRESSURE: 78 MMHG

## 2023-01-12 DIAGNOSIS — Z23 FLU VACCINE NEED: ICD-10-CM

## 2023-01-12 DIAGNOSIS — R36.1 HEMATOSPERMIA: ICD-10-CM

## 2023-01-12 DIAGNOSIS — E78.00 PURE HYPERCHOLESTEROLEMIA: Primary | ICD-10-CM

## 2023-01-12 DIAGNOSIS — I10 ESSENTIAL HYPERTENSION, BENIGN: ICD-10-CM

## 2023-01-12 DIAGNOSIS — R42 VERTIGO: ICD-10-CM

## 2023-01-12 LAB
ALBUMIN SERPL-MCNC: 4.6 G/DL (ref 3.6–5.1)
ALBUMIN/GLOB SERPL: 1.8 {RATIO} (ref 1–2.5)
ALP SERPL-CCNC: 75 U/L (ref 33–130)
ALT 1742-6: 16 U/L (ref 0–32)
AST 1920-8: 16 U/L (ref 0–35)
BILIRUB SERPL-MCNC: 1.2 MG/DL (ref 0.2–1.2)
BUN SERPL-MCNC: 12 MG/DL (ref 7–25)
BUN/CREATININE RATIO: 11.9 (ref 6–22)
CALCIUM SERPL-MCNC: 9.5 MG/DL (ref 8.6–10.3)
CHLORIDE SERPLBLD-SCNC: 101.3 MMOL/L (ref 98–110)
CHOLEST SERPL-MCNC: 149 MG/DL (ref 0–199)
CHOLEST/HDLC SERPL: 3 {RATIO} (ref 0–5)
CO2 SERPL-SCNC: 30.9 MMOL/L (ref 20–32)
CREAT SERPL-MCNC: 1.01 MG/DL (ref 0.6–1.3)
GLOBULIN, CALCULATED - QUEST: 2.6 (ref 1.9–3.7)
GLUCOSE SERPL-MCNC: 84 MG/DL (ref 60–99)
HDLC SERPL-MCNC: 43 MG/DL (ref 40–150)
LDLC SERPL CALC-MCNC: 86 MG/DL (ref 0–130)
POTASSIUM SERPL-SCNC: 4.69 MMOL/L (ref 3.5–5.3)
PROT SERPL-MCNC: 7.2 G/DL (ref 6.1–8.1)
SODIUM SERPL-SCNC: 139.2 MMOL/L (ref 135–146)
TRIGL SERPL-MCNC: 98 MG/DL (ref 0–149)

## 2023-01-12 PROCEDURE — 90686 IIV4 VACC NO PRSV 0.5 ML IM: CPT | Performed by: FAMILY MEDICINE

## 2023-01-12 PROCEDURE — 80061 LIPID PANEL: CPT | Performed by: FAMILY MEDICINE

## 2023-01-12 PROCEDURE — 99214 OFFICE O/P EST MOD 30 MIN: CPT | Mod: 25 | Performed by: FAMILY MEDICINE

## 2023-01-12 PROCEDURE — 90471 IMMUNIZATION ADMIN: CPT | Performed by: FAMILY MEDICINE

## 2023-01-12 PROCEDURE — 80053 COMPREHEN METABOLIC PANEL: CPT | Performed by: FAMILY MEDICINE

## 2023-01-12 PROCEDURE — 36415 COLL VENOUS BLD VENIPUNCTURE: CPT | Performed by: FAMILY MEDICINE

## 2023-01-12 RX ORDER — ATORVASTATIN CALCIUM 20 MG/1
20 TABLET, FILM COATED ORAL DAILY
Qty: 90 TABLET | Refills: 1 | Status: SHIPPED | OUTPATIENT
Start: 2023-01-12 | End: 2023-07-10

## 2023-01-12 RX ORDER — AMLODIPINE BESYLATE 5 MG/1
5 TABLET ORAL DAILY
Qty: 90 TABLET | Refills: 1 | Status: SHIPPED | OUTPATIENT
Start: 2023-01-12 | End: 2023-07-10

## 2023-01-12 RX ORDER — LISINOPRIL AND HYDROCHLOROTHIAZIDE 12.5; 2 MG/1; MG/1
1 TABLET ORAL DAILY
Qty: 90 TABLET | Refills: 1 | Status: SHIPPED | OUTPATIENT
Start: 2023-01-12 | End: 2023-07-10

## 2023-01-12 NOTE — PROGRESS NOTES
Assessment & Plan     Pure hypercholesterolemia  Control uncertain, continue current medications at current doses pending labs  - atorvastatin (LIPITOR) 20 MG tablet  Dispense: 90 tablet; Refill: 1  - Lipid Panel (BFP)  - Comprehensive Metobolic Panel (BFP)  - VENOUS COLLECTION    Essential hypertension, benign  Well controlled, continue current medications at current doses   - amLODIPine (NORVASC) 5 MG tablet  Dispense: 90 tablet; Refill: 1  - lisinopril-hydrochlorothiazide (ZESTORETIC) 20-12.5 MG tablet  Dispense: 90 tablet; Refill: 1  - Comprehensive Metobolic Panel (BFP)  - VENOUS COLLECTION    Flu vaccine need    - FLU VAC PRESRV FREE QUAD SPLIT VIR 3+YRS IM    Vertigo  Resolved mainly    Hematospermia  resolved      Review of external notes as documented elsewhere in note  Review of the result(s) of each unique test - labs  Ordering of each unique test  Prescription drug management         FUTURE APPOINTMENTS:       - Follow-up visit in 6 mo  Work on weight loss  Regular exercise    No follow-ups on file.    Ari Galo MD  [facility]    Maira Madrigal is a 52 year old, presenting for the following health issues:  Recheck Medication      HPI     Hyperlipidemia Follow-Up      Are you regularly taking any medication or supplement to lower your cholesterol?   Yes- atorvastatin    Are you having muscle aches or other side effects that you think could be caused by your cholesterol lowering medication?  No    Hypertension Follow-up      Do you check your blood pressure regularly outside of the clinic? Yes     Are you following a low salt diet? No    Are your blood pressures ever more than 140 on the top number (systolic) OR more   than 90 on the bottom number (diastolic), for example 140/90? No      How many servings of fruits and vegetables do you eat daily?  2-3    On average, how many sweetened beverages do you drink each day (Examples: soda, juice, sweet tea, etc.  Do NOT  "count diet or artificially sweetened beverages)?   1    How many days per week do you exercise enough to make your heart beat faster? 4    How many minutes a day do you exercise enough to make your heart beat faster? 30 - 60    How many days per week do you miss taking your medication? 0    Vertigo resolved    Pt saw urology for hematospermia-placed on abx for possible prostatitis    Review of Systems   Constitutional, HEENT, cardiovascular, pulmonary, gi and gu systems are negative, except as otherwise noted.      Objective    /78 (BP Location: Left arm, Patient Position: Chair, Cuff Size: Adult Large)   Pulse 69   Temp 97.2  F (36.2  C) (Temporal)   Resp 20   Ht 1.765 m (5' 9.5\")   Wt 99.8 kg (220 lb)   BMI 32.02 kg/m    Body mass index is 32.02 kg/m .  Physical Exam   GENERAL: healthy, alert and no distress  EYES: Eyes grossly normal to inspection, PERRL and conjunctivae and sclerae normal  HENT: ear canals and TM's normal, nose and mouth without ulcers or lesions  NECK: no adenopathy, no asymmetry, masses, or scars and thyroid normal to palpation  RESP: lungs clear to auscultation - no rales, rhonchi or wheezes  CV: regular rate and rhythm, normal S1 S2, no S3 or S4, no murmur, click or rub, no peripheral edema and peripheral pulses strong  ABDOMEN: soft, nontender, no hepatosplenomegaly, no masses and bowel sounds normal  MS: no gross musculoskeletal defects noted, no edema  NEURO: Normal strength and tone, mentation intact and speech normal  PSYCH: mentation appears normal, affect normal/bright    Office Visit on 09/08/2022   Component Date Value Ref Range Status     Cholesterol 09/08/2022 121  0 - 199 mg/dL Final     Triglycerides 09/08/2022 70  0 - 149 mg/dL Final     HDL Cholesterol 09/08/2022 40  40 - 150 mg/dL Final     LDL Cholesterol Direct 09/08/2022 67  0 - 130 mg/dL Final     Cholesterol/HDL Ratio 09/08/2022 3  0 - 5 Final     Carbon Dioxide 09/08/2022 30.7  20 - 32 mmol/L Final     " Creatinine 09/08/2022 1.16  0.60 - 1.30 mg/dL Final     Glucose 09/08/2022 105 (A)  60 - 99 mg/dL Final     Sodium 09/08/2022 139.3  135 - 146 mmol/L Final     Potassium 09/08/2022 4.81  3.5 - 5.3 mmol/L Final     Chloride 09/08/2022 102.1  98 - 110 mmol/L Final     Protein Total 09/08/2022 7.0  6.1 - 8.1 g/dL Final     Albumin 09/08/2022 4.5  3.6 - 5.1 g/dL Final     Alkaline Phosphatase 09/08/2022 71  33 - 130 U/L Final     ALT 09/08/2022 22  0 - 32 U/L Final     AST 09/08/2022 19  0 - 35 U/L Final     Bilirubin Total 09/08/2022 0.7  0.2 - 1.2 mg/dL Final     Urea Nitrogen 09/08/2022 13  7 - 25 mg/dL Final     Calcium 09/08/2022 9.4  8.6 - 10.3 mg/dL Final     BUN/Creatinine Ratio 09/08/2022 11.2  6 - 22 Final     Globulin Calculated 09/08/2022 2.5  1.9 - 3.7 Final     A/G Ratio 09/08/2022 1.8  1 - 2.5 Final     WBC 09/08/2022 9.5  4.0 - 11 10*9/L Final     RBC Count 09/08/2022 4.73  4.4 - 5.9 10*12/L Final     Hemoglobin 09/08/2022 14.7  13.3 - 17.7 g/dL Final     Hematocrit 09/08/2022 43.3  40.0 - 53.0 % Final     MCV 09/08/2022 91.6  78 - 100 fL Final     MCH 09/08/2022 31.1  26 - 33 pg Final     MCHC 09/08/2022 33.9  31 - 36 g/dL Final     Platelet Count 09/08/2022 320  150 - 375 10^9/L Final     % Granulocytes 09/08/2022 69.4  % Final     % Lymphocytes 09/08/2022 24.5  % Final     % Monocytes 09/08/2022 6.1  % Final

## 2023-01-12 NOTE — NURSING NOTE
Rohan Ventura is here for a blood pressure check and medication refill.  Questioned patient about current smoking habits.  Pt. has never smoked.  Body mass index is 33.67 kg/(m^2).  PULSE regular  My Chart: active    Pre-visit planning  Immunizations - up to date  Colonoscopy - is up to date  Mammogram -   Asthma -   PHQ9 -    ALEJANDRO-7 -

## 2023-04-01 ENCOUNTER — HEALTH MAINTENANCE LETTER (OUTPATIENT)
Age: 53
End: 2023-04-01

## 2023-04-20 ENCOUNTER — OFFICE VISIT (OUTPATIENT)
Dept: FAMILY MEDICINE | Facility: CLINIC | Age: 53
End: 2023-04-20

## 2023-04-20 VITALS
WEIGHT: 220 LBS | HEART RATE: 67 BPM | OXYGEN SATURATION: 97 % | HEIGHT: 70 IN | SYSTOLIC BLOOD PRESSURE: 124 MMHG | BODY MASS INDEX: 31.5 KG/M2 | TEMPERATURE: 97.9 F | DIASTOLIC BLOOD PRESSURE: 78 MMHG

## 2023-04-20 DIAGNOSIS — R30.0 DYSURIA: Primary | ICD-10-CM

## 2023-04-20 LAB
APPEARANCE UR: ABNORMAL
BACTERIA, UR: ABNORMAL
BILIRUB UR QL: ABNORMAL
CASTS/LPF: ABNORMAL
COLOR UR: ABNORMAL
EP/HPF: ABNORMAL
GLUCOSE URINE: ABNORMAL MG/DL
HGB UR QL: ABNORMAL
KETONES UR QL: ABNORMAL MG/DL
MISC.: ABNORMAL
NITRITE UR QL STRIP: ABNORMAL
PH UR STRIP: 6 PH (ref 5–7)
PROT UR QL: ABNORMAL MG/DL
RBC, UR MICRO: ABNORMAL (ref ?–2)
SP GR UR STRIP: ABNORMAL (ref 1–1.03)
UROBILINOGEN UR QL STRIP: 0.2 EU/DL (ref 0.2–1)
WBC #/AREA URNS HPF: ABNORMAL /[HPF]
WBC, UR MICRO: ABNORMAL (ref ?–2)

## 2023-04-20 PROCEDURE — 99213 OFFICE O/P EST LOW 20 MIN: CPT | Performed by: FAMILY MEDICINE

## 2023-04-20 PROCEDURE — 81001 URINALYSIS AUTO W/SCOPE: CPT | Performed by: FAMILY MEDICINE

## 2023-04-20 PROCEDURE — 87086 URINE CULTURE/COLONY COUNT: CPT | Mod: 90 | Performed by: FAMILY MEDICINE

## 2023-04-20 RX ORDER — CIPROFLOXACIN 500 MG/1
500 TABLET, FILM COATED ORAL 2 TIMES DAILY
Qty: 14 TABLET | Refills: 0 | Status: SHIPPED | OUTPATIENT
Start: 2023-04-20 | End: 2023-07-10

## 2023-04-20 NOTE — PROGRESS NOTES
SUBJECTIVE: Rohan Ventura is a 53 year old male who complains of urinary frequency and dysuria x 6 days, without flank pain, fever, chills, or abnormal penile discharge or bleeding. No nausea or vomiting     Pt mainly notes burning at end of urination mainly    Pt , no sexual activity, no concerns about STI's    Pt did try azo this am and felt better-was concerned by color change in urine    Patient Active Problem List   Diagnosis     Obesity     Labyrinthitis     Essential hypertension, benign     Pure hypercholesterolemia     ACP (advance care planning)     Abnormal glucose     Health Care Home     Past Medical History:   Diagnosis Date     Panic disorder without agoraphobia      Family History   Problem Relation Age of Onset     C.A.D. Father          age 58     Hypertension Father      Hypertension Mother      Diabetes Mother      Lipids Mother      Osteoporosis Mother          age 80 after hip fracture     Hypertension Maternal Grandfather      Gastrointestinal Disease Sister         Crohns     Gastrointestinal Disease Sister         Crohns     Social History     Socioeconomic History     Marital status:      Spouse name: Not on file     Number of children: Not on file     Years of education: Not on file     Highest education level: Not on file   Occupational History     Occupation: repair   Tobacco Use     Smoking status: Former     Types: Cigarettes     Quit date: 1994     Years since quittin.3     Passive exposure: Never     Smokeless tobacco: Never   Vaping Use     Vaping status: Not on file   Substance and Sexual Activity     Alcohol use: Yes     Alcohol/week: 0.0 standard drinks of alcohol     Drug use: No     Sexual activity: Yes   Other Topics Concern      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 [employer] [facility] [employer]      Blood Transfusions No     Caffeine Concern No     Occupational Exposure No     Hobby Hazards No     Sleep Concern No     Stress Concern No  "    Weight Concern No     Special Diet No     Back Care No     Exercise No     Bike Helmet No     Seat Belt Yes     Self-Exams Not Asked   Social History Narrative     Not on file     Social Determinants of Health     Financial Resource Strain: Not on file   Food Insecurity: Not on file   Transportation Needs: Not on file   Physical Activity: Not on file   Stress: Not on file   Social Connections: Not on file   Intimate Partner Violence: Not on file   Housing Stability: Not on file     Past Surgical History:   Procedure Laterality Date     [facility] NONSPECIFIC PROCEDURE  1997    LUNG ABSCESS SURGERY-empyema ; dr Mendes     amLODIPine (NORVASC) 5 MG tablet, Take 1 tablet (5 mg) by mouth daily  atorvastatin (LIPITOR) 20 MG tablet, Take 1 tablet (20 mg) by mouth daily  lisinopril-hydrochlorothiazide (ZESTORETIC) 20-12.5 MG tablet, Take 1 tablet by mouth daily  multivitamin, therapeutic with minerals (MULTI-VITAMIN) TABS, Take 1 tablet by mouth daily    No current facility-administered medications on file prior to visit.       Allergies: No known drug allergies    Immunization History   Administered Date(s) Administered     COVID-19 Vaccine 12+ (Pfizer) 04/14/2021, 05/04/2021, 11/11/2021     Hepatitis A (ADULT 19+) 01/18/2019     Influenza (IIV3) PF 09/16/2010, 09/24/2011, 09/01/2012, 09/09/2013, 09/16/2014     Influenza Vaccine >6 months (Alfuria,Fluzone) 02/08/2016, 01/18/2019, 11/30/2020, 11/18/2021, 01/12/2023     Influenza Vaccine, 6+MO IM (QUADRIVALENT W/PRESERVATIVES) 10/16/2019     TD,PF 7+ (Tenivac) 04/29/2003, 09/08/2022     TDAP Vaccine (Boostrix) 09/01/2012        OBJECTIVE: /78 (BP Location: Left arm, Patient Position: Sitting, Cuff Size: Adult Large)   Pulse 67   Temp 97.9  F (36.6  C) (Temporal)   Ht 1.765 m (5' 9.5\")   Wt 99.8 kg (220 lb)   SpO2 97%   BMI 32.02 kg/m   Appears well, in no apparent distress.  Vital signs are normal. The abdomen is soft without tenderness, guarding, mass, rebound or " organomegaly. No CVA tenderness or inguinal adenopathy noted. Urine dipstick shows positive for WBC's, positive for nitrates, positive for leukocytes and positive for bacturia.  Micro exam: 10-25 WBC's per HPF and mod+ bacteria.     ASSESSMENT: Likely UTI uncomplicated without evidence of pyelonephritis-could also suggest prostatitis-recommend 7 days cipro and cx pending    PLAN: Cipro, I reviewed the risks, benefits, and possible side effects of the medication.  The patient had an opportunity to ask any questions regarding the treatment plan. The patient was encouraged to call my office if any problems.     Discussed rare issue with tendon rupture- watch for any symptoms     Treatment per orders - also push fluids, may use Pyridium OTC prn. Call or return to clinic prn if these symptoms worsen or fail to improve as anticipated.

## 2023-04-20 NOTE — NURSING NOTE
Chief Complaint   Patient presents with     UTI     Urinary urgency, dysuria for the last 6 days, has been taking AZO     Pre-visit Screening:  Immunizations:  not up to date - shingrix at pharmacy  Colonoscopy:  is up to date  Mammogram: NA  Asthma Action Test/Plan:  NA  PHQ9:  NA  GAD7:  NA  Questioned patient about current smoking habits Pt. quit smoking some time ago.  Ok to leave detailed message on voice mail for today's visit only Yes, phone # [phone]

## 2023-04-22 LAB — URINE VOIDED CULTURE: NORMAL

## 2023-07-10 ENCOUNTER — OFFICE VISIT (OUTPATIENT)
Dept: FAMILY MEDICINE | Facility: CLINIC | Age: 53
End: 2023-07-10

## 2023-07-10 VITALS
BODY MASS INDEX: 28.77 KG/M2 | TEMPERATURE: 97.8 F | WEIGHT: 201 LBS | SYSTOLIC BLOOD PRESSURE: 122 MMHG | HEIGHT: 70 IN | OXYGEN SATURATION: 98 % | DIASTOLIC BLOOD PRESSURE: 74 MMHG | HEART RATE: 76 BPM

## 2023-07-10 DIAGNOSIS — M54.6 ACUTE RIGHT-SIDED THORACIC BACK PAIN: ICD-10-CM

## 2023-07-10 DIAGNOSIS — I10 ESSENTIAL HYPERTENSION, BENIGN: ICD-10-CM

## 2023-07-10 DIAGNOSIS — E78.00 PURE HYPERCHOLESTEROLEMIA: Primary | ICD-10-CM

## 2023-07-10 LAB
ALBUMIN SERPL-MCNC: 4.5 G/DL (ref 3.6–5.1)
ALBUMIN/GLOB SERPL: 1.6 {RATIO} (ref 1–2.5)
ALP SERPL-CCNC: 64 U/L (ref 33–130)
ALT 1742-6: 17 U/L (ref 0–32)
AST 1920-8: 19 U/L (ref 0–35)
BILIRUB SERPL-MCNC: 0.9 MG/DL (ref 0.2–1.2)
BUN SERPL-MCNC: 12 MG/DL (ref 7–25)
BUN/CREATININE RATIO: 10.3 (ref 6–32)
CALCIUM SERPL-MCNC: 9.8 MG/DL (ref 8.6–10.3)
CHLORIDE SERPLBLD-SCNC: 101.5 MMOL/L (ref 98–110)
CHOLEST SERPL-MCNC: 117 MG/DL (ref 0–199)
CHOLEST/HDLC SERPL: 3 {RATIO} (ref 0–5)
CO2 SERPL-SCNC: 25.8 MMOL/L (ref 20–32)
CREAT SERPL-MCNC: 1.16 MG/DL (ref 0.6–1.3)
GLOBULIN, CALCULATED - QUEST: 2.9 (ref 1.9–3.7)
GLUCOSE SERPL-MCNC: 98 MG/DL (ref 60–99)
HDLC SERPL-MCNC: 45 MG/DL (ref 40–150)
LDLC SERPL CALC-MCNC: 57 MG/DL (ref 0–130)
POTASSIUM SERPL-SCNC: 3.99 MMOL/L (ref 3.5–5.3)
PROT SERPL-MCNC: 7.4 G/DL (ref 6.1–8.1)
SODIUM SERPL-SCNC: 138.6 MMOL/L (ref 135–146)
TRIGL SERPL-MCNC: 76 MG/DL (ref 0–149)

## 2023-07-10 PROCEDURE — 36415 COLL VENOUS BLD VENIPUNCTURE: CPT | Performed by: FAMILY MEDICINE

## 2023-07-10 PROCEDURE — 80053 COMPREHEN METABOLIC PANEL: CPT | Performed by: FAMILY MEDICINE

## 2023-07-10 PROCEDURE — 80061 LIPID PANEL: CPT | Performed by: FAMILY MEDICINE

## 2023-07-10 PROCEDURE — 99214 OFFICE O/P EST MOD 30 MIN: CPT | Performed by: FAMILY MEDICINE

## 2023-07-10 RX ORDER — LISINOPRIL AND HYDROCHLOROTHIAZIDE 12.5; 2 MG/1; MG/1
1 TABLET ORAL DAILY
Qty: 90 TABLET | Refills: 1 | Status: SHIPPED | OUTPATIENT
Start: 2023-07-10 | End: 2024-04-04

## 2023-07-10 RX ORDER — AMLODIPINE BESYLATE 5 MG/1
5 TABLET ORAL DAILY
Qty: 90 TABLET | Refills: 1 | Status: SHIPPED | OUTPATIENT
Start: 2023-07-10 | End: 2024-04-04

## 2023-07-10 RX ORDER — ATORVASTATIN CALCIUM 20 MG/1
20 TABLET, FILM COATED ORAL DAILY
Qty: 90 TABLET | Refills: 1 | Status: SHIPPED | OUTPATIENT
Start: 2023-07-10 | End: 2024-04-04

## 2023-07-10 NOTE — NURSING NOTE
Chief Complaint   Patient presents with     Back Pain     Back pain occurs daily for the last few weeks, pain is between his shoulder blades, he gets a tingling sensation from his back that wraps around to his right side      Pre-visit Screening:  Immunizations:  not up to date - shingrix at pharmacy  Colonoscopy:  is up to date  Mammogram: NA  Asthma Action Test/Plan:  NA  PHQ9:  NA  GAD7:  NA  Questioned patient about current smoking habits Pt. quit smoking some time ago.  Ok to leave detailed message on voice mail for today's visit only Yes, phone # [phone]

## 2023-07-10 NOTE — PROGRESS NOTES
"  Assessment & Plan     Pure hypercholesterolemia  Control uncertain, ate cereal only this am, continue current medications at current doses pending labs  - Lipid Panel (BFP)  - Comprehensive Metobolic Panel (BFP)  - VENOUS COLLECTION  - atorvastatin (LIPITOR) 20 MG tablet  Dispense: 90 tablet; Refill: 1    Essential hypertension, benign  Well controlled, continue current medications at current doses   - Comprehensive Metobolic Panel (BFP)  - VENOUS COLLECTION  - amLODIPine (NORVASC) 5 MG tablet  Dispense: 90 tablet; Refill: 1  - lisinopril-hydrochlorothiazide (ZESTORETIC) 20-12.5 MG tablet  Dispense: 90 tablet; Refill: 1    Acute right-sided thoracic back pain  Apparent strain    Recommend stretching to include rowing motion, twisting, no bench press or golf for a week, nsaids as desired, f/u if not improving or worsening by MC-could do PT if not better      Review of the result(s) of each unique test - labs  Ordering of each unique test  Prescription drug management         BMI:   Estimated body mass index is 29.26 kg/m  as calculated from the following:    Height as of this encounter: 1.765 m (5' 9.5\").    Weight as of this encounter: 91.2 kg (201 lb).   Weight management plan: Discussed healthy diet and exercise guidelines    FUTURE APPOINTMENTS:       - Follow-up visit in 6 mo  Work on weight loss  Regular exercise    No follow-ups on file.    Ari Galo MD  [facility]    Maira Madrigal is a 53 year old, presenting for the following health issues:  Back Pain (Back pain occurs daily for the last few weeks, pain is between his shoulder blades, he gets a tingling sensation from his back that wraps around to his right side )    Med check  HPI     Hyperlipidemia Follow-Up      Are you regularly taking any medication or supplement to lower your cholesterol?   Yes- atorvastatin    Are you having muscle aches or other side effects that you think could be caused by your cholesterol " "lowering medication?  No    Hypertension Follow-up      Do you check your blood pressure regularly outside of the clinic? Yes 110's/60's    Are you following a low salt diet? No    Are your blood pressures ever more than 140 on the top number (systolic) OR more   than 90 on the bottom number (diastolic), for example 140/90? No      How many servings of fruits and vegetables do you eat daily?  2-3    On average, how many sweetened beverages do you drink each day (Examples: soda, juice, sweet tea, etc.  Do NOT count diet or artificially sweetened beverages)?   1    How many days per week do you exercise enough to make your heart beat faster? 5    How many minutes a day do you exercise enough to make your heart beat faster? 60 or more    How many days per week do you miss taking your medication? 0      Pt also noting upper back pain right -feels below shoulder blade, worse with certain movements, present 2 weeks. Pt saw Chiro which helped some- pt is able to exercise and do his walks    Review of Systems   Constitutional, HEENT, cardiovascular, pulmonary, gi and gu systems are negative, except as otherwise noted.      Objective    /74 (BP Location: Left arm, Patient Position: Sitting, Cuff Size: Adult Large)   Pulse 76   Temp 97.8  F (36.6  C) (Temporal)   Ht 1.765 m (5' 9.5\")   Wt 91.2 kg (201 lb)   SpO2 98%   BMI 29.26 kg/m    Body mass index is 29.26 kg/m .  Physical Exam   GENERAL: healthy, alert and no distress  EYES: Eyes grossly normal to inspection, PERRL and conjunctivae and sclerae normal  HENT: ear canals and TM's normal, nose and mouth without ulcers or lesions  NECK: no adenopathy, no asymmetry, masses, or scars and thyroid normal to palpation  RESP: lungs clear to auscultation - no rales, rhonchi or wheezes  CV: regular rate and rhythm, normal S1 S2, no S3 or S4, no murmur, click or rub, no peripheral edema and peripheral pulses strong  ABDOMEN: soft, nontender, no hepatosplenomegaly, no " masses and bowel sounds normal  MS: no gross musculoskeletal defects noted, no edema  MS: upper back with mild tenderness right inferior scapula  NEURO: Normal strength and tone, mentation intact and speech normal  PSYCH: mentation appears normal, affect normal/bright    Office Visit on 04/20/2023   Component Date Value Ref Range Status     Color Urine 04/20/2023 Orange (A)   Final     Appearance Urine 04/20/2023 Cloudy (A)   Final     Glucose Urine 04/20/2023 Neg  neg mg/dL Final     Bilirubin Urine 04/20/2023 Neg  neg Final     Ketones Urine 04/20/2023 Neg  neg mg/dL Final    <=1.005     Specific Gravity Urine 04/20/2023 Other (A)  1.003 - 1.035 Final     Blood Urine 04/20/2023 Neg  neg Final     pH Urine 04/20/2023 6.0  5.0 - 7.0 pH Final     Protein Urine 04/20/2023 NEG  neg - neg mg/dL Final     Urobilinogen Urine 04/20/2023 0.2  0.2 - 1.0 EU/dL Final     Nitrite Urine 04/20/2023 Pos (A)  NEG Final     Leukocytes 04/20/2023 SMALL (A)   Final     Wbc, Urine Micro 04/20/2023 10-20 (A)  neg - 2 Final     RBC Micro Urine 04/20/2023 1-3 (A)  neg - 2 Final     EP/HPF 04/20/2023 FEW   Final     Bacteria Urine 04/20/2023 MOD (A)  neg - neg Final     Casts/LPF 04/20/2023 neg   Final     Miscellaneous 04/20/2023 neg   Final     Urine Voided Culture 04/20/2023 SEE NOTE   Final    Comment:     CULTURE, URINE, ROUTINE         Micro Number:      [id number]    Test Status:       Final    Specimen Source:   Urine    Specimen Quality:  Adequate    Result:            No Growth

## 2023-12-20 ENCOUNTER — OFFICE VISIT (OUTPATIENT)
Dept: FAMILY MEDICINE | Facility: CLINIC | Age: 53
End: 2023-12-20

## 2023-12-20 VITALS
WEIGHT: 200 LBS | HEART RATE: 74 BPM | OXYGEN SATURATION: 98 % | RESPIRATION RATE: 18 BRPM | TEMPERATURE: 97.8 F | BODY MASS INDEX: 29.11 KG/M2 | SYSTOLIC BLOOD PRESSURE: 134 MMHG | DIASTOLIC BLOOD PRESSURE: 76 MMHG

## 2023-12-20 DIAGNOSIS — H92.12 DRAINAGE FROM LEFT EAR: Primary | ICD-10-CM

## 2023-12-20 PROCEDURE — 99213 OFFICE O/P EST LOW 20 MIN: CPT | Performed by: FAMILY MEDICINE

## 2023-12-20 RX ORDER — AMOXICILLIN 875 MG
875 TABLET ORAL 2 TIMES DAILY
Qty: 20 TABLET | Refills: 0 | Status: SHIPPED | OUTPATIENT
Start: 2023-12-20 | End: 2024-04-04

## 2023-12-20 NOTE — PROGRESS NOTES
SUBJECTIVE:  Rohan Ventura is a 53 year old male brought by self with 6 months history of intermittent fullness, muffled sound,  and clear drainage from left ear,. Temperature not elevated at home.  No other URI symptoms     NO dizziness. No HA    Pt flying in 3 days to [place]    Pt does not recall any sharp pain in ear prior to these symptoms starting- he does have a pool and swims quite a bit.    No new meds or supplements    Patient Active Problem List   Diagnosis    Obesity    Labyrinthitis    Essential hypertension, benign    Pure hypercholesterolemia    ACP (advance care planning)    Abnormal glucose    Health Care Home       Past Medical History:   Diagnosis Date    Panic disorder without agoraphobia        Family History   Problem Relation Age of Onset    C.A.D. Father          age 58    Hypertension Father     Hypertension Mother     Diabetes Mother     Lipids Mother     Osteoporosis Mother          age 80 after hip fracture    Hypertension Maternal Grandfather     Gastrointestinal Disease Sister         Crohns    Gastrointestinal Disease Sister         Crohns       Social History     Socioeconomic History    Marital status:      Spouse name: Not on file    Number of children: Not on file    Years of education: Not on file    Highest education level: Not on file   Occupational History    Occupation: repair   Tobacco Use    Smoking status: Former     Types: Cigarettes     Quit date: 1994     Years since quittin.9     Passive exposure: Never    Smokeless tobacco: Never   Substance and Sexual Activity    Alcohol use: Yes     Alcohol/week: 0.0 standard drinks of alcohol    Drug use: No    Sexual activity: Yes   Other Topics Concern     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, [employer] , infantry     Blood Transfusions No    Caffeine Concern No    Occupational Exposure No    Hobby Hazards No    Sleep Concern No    Stress Concern No    Weight Concern No    Special Diet No    Back  Care No    Exercise No    Bike Helmet No    Seat Belt Yes    Self-Exams Not Asked   Social History Narrative    Not on file     Social Determinants of Health     Financial Resource Strain: Not on file   Food Insecurity: Not on file   Transportation Needs: Not on file   Physical Activity: Not on file   Stress: Not on file   Social Connections: Not on file   Interpersonal Safety: Not on file   Housing Stability: Not on file       Past Surgical History:   Procedure Laterality Date    [facility] NONSPECIFIC PROCEDURE  1997    LUNG ABSCESS SURGERY-empyema ; dr Mendes     amLODIPine (NORVASC) 5 MG tablet, Take 1 tablet (5 mg) by mouth daily  atorvastatin (LIPITOR) 20 MG tablet, Take 1 tablet (20 mg) by mouth daily  lisinopril-hydrochlorothiazide (ZESTORETIC) 20-12.5 MG tablet, Take 1 tablet by mouth daily  multivitamin, therapeutic with minerals (MULTI-VITAMIN) TABS, Take 1 tablet by mouth daily    No current facility-administered medications on file prior to visit.       Allergies: No known drug allergy      Immunization History   Administered Date(s) Administered    COVID-19 MONOVALENT 12+ (Pfizer) 04/14/2021, 05/04/2021, 11/11/2021    Hepatitis A (ADULT 19+) 01/18/2019    Influenza (IIV3) PF 09/16/2010, 09/24/2011, 09/01/2012, 09/09/2013, 09/16/2014    Influenza Vaccine >6 months,quad, PF 02/08/2016, 01/18/2019, 11/30/2020, 11/18/2021, 01/12/2023    Influenza Vaccine, 6+MO IM (QUADRIVALENT W/PRESERVATIVES) 10/16/2019    TD,PF 7+ (Tenivac) 04/29/2003, 09/08/2022    TDAP Vaccine (Boostrix) 09/01/2012        OBJECTIVE:  /76 (BP Location: Left arm, Patient Position: Sitting, Cuff Size: Adult Large)   Pulse 74   Temp 97.8  F (36.6  C) (Temporal)   Resp 18   Wt 90.7 kg (200 lb)   SpO2 98%   BMI 29.11 kg/m    General appearance: healthy, alert, and mild distress,crying.    Ears: abnormal: R TM normal and moderate cerumen; L TM dull and minimally erythematous, n perforation noted  Nose: normal  Oropharynx: normal  Neck:  normal, supple, and no adenopathy  Lungs: chest clear to IPPA and clear to IPPA    ASSESSMENT:  (H92.12) Drainage from left ear  (primary encounter diagnosis)  Comment: no clear OM- no clear perforation, unclear why seeing fluid drainage  Plan: amoxicillin (AMOXIL) 875 MG tablet        We agree to try abx in case smoldering infection but ultimately will need to see ENT if not resolving in 10 days     PLAN:  1) Antibiotics per [facility] orders.  2) Symptomatic therapy suggested: use  prn.   3) Call or return to clinic prn if these symptoms worsen or fail to improve as anticipated.

## 2024-01-08 ENCOUNTER — TELEPHONE (OUTPATIENT)
Dept: FAMILY MEDICINE | Facility: CLINIC | Age: 54
End: 2024-01-08

## 2024-01-08 DIAGNOSIS — H92.12 DRAINAGE FROM LEFT EAR: Primary | ICD-10-CM

## 2024-01-08 NOTE — TELEPHONE ENCOUNTER
I talked with patient. The ENT referral was faxed this morning at 10:33am to     [facility]  [address]  [address] [place] [id number]  [phone] - appt line  [phone] - fax     Patient was given above information to call and schedule his appointment.

## 2024-01-08 NOTE — TELEPHONE ENCOUNTER
Pt called stating he is still having issues with his left ear; muffled sounding and drainage. He said you mentioned seeing ENT. He would like this referral.    Please advise # [phone]    Thanks, Sobeida DUKES

## 2024-02-22 ENCOUNTER — TRANSFERRED RECORDS (OUTPATIENT)
Dept: FAMILY MEDICINE | Facility: CLINIC | Age: 54
End: 2024-02-22

## 2024-02-28 ENCOUNTER — TELEPHONE (OUTPATIENT)
Dept: FAMILY MEDICINE | Facility: CLINIC | Age: 54
End: 2024-02-28

## 2024-02-28 NOTE — TELEPHONE ENCOUNTER
Pt called wanting to know who had to fill out FMLA forms (him or his son) in regards to taking his son to all of his appointments with Psychiatry. I talked to Dr. Galo regarding the issue before calling him back due to the fact that they were just in the day prior for his son's hospital follow-up for psych issue. Dr. Galo informed me that Rohan would have to make his own appointment (could be virtual) for those forms to be acceptable. I called patient back and relayed the info and he was not in agreement with the protocol and said he would go to psychiatry at [facility] to get those forms approved for him.    His phone number just in [phone]

## 2024-03-30 DIAGNOSIS — E78.00 PURE HYPERCHOLESTEROLEMIA: ICD-10-CM

## 2024-03-30 DIAGNOSIS — I10 ESSENTIAL HYPERTENSION, BENIGN: ICD-10-CM

## 2024-04-01 RX ORDER — AMLODIPINE BESYLATE 5 MG/1
5 TABLET ORAL DAILY
COMMUNITY
Start: 2024-04-01

## 2024-04-01 RX ORDER — LISINOPRIL AND HYDROCHLOROTHIAZIDE 12.5; 2 MG/1; MG/1
1 TABLET ORAL DAILY
COMMUNITY
Start: 2024-04-01

## 2024-04-01 RX ORDER — ATORVASTATIN CALCIUM 20 MG/1
20 TABLET, FILM COATED ORAL DAILY
COMMUNITY
Start: 2024-04-01

## 2024-04-01 NOTE — TELEPHONE ENCOUNTER
Received incoming refill request for  Pending Prescriptions:                       Disp   Refills    atorvastatin (LIPITOR) 20 MG tablet [Phar*90 tab*1            Sig: TAKE 1 TABLET BY MOUTH EVERY DAY    amLODIPine (NORVASC) 5 MG tablet [Pharmac*90 tab*1            Sig: TAKE 1 TABLET BY MOUTH EVERY DAY    lisinopril-hydrochlorothiazide (ZESTORETI*90 tab*1            Sig: TAKE 1 TABLET BY MOUTH EVERY DAY    Patient is due for his 6 month medication check and review appt. Chatosity message sent.

## 2024-04-04 ENCOUNTER — OFFICE VISIT (OUTPATIENT)
Dept: FAMILY MEDICINE | Facility: CLINIC | Age: 54
End: 2024-04-04

## 2024-04-04 VITALS
WEIGHT: 203 LBS | SYSTOLIC BLOOD PRESSURE: 128 MMHG | OXYGEN SATURATION: 97 % | DIASTOLIC BLOOD PRESSURE: 76 MMHG | BODY MASS INDEX: 29.06 KG/M2 | TEMPERATURE: 98.1 F | HEIGHT: 70 IN | HEART RATE: 79 BPM

## 2024-04-04 DIAGNOSIS — Z12.5 SPECIAL SCREENING FOR MALIGNANT NEOPLASM OF PROSTATE: ICD-10-CM

## 2024-04-04 DIAGNOSIS — Z23 NEED FOR VACCINATION: ICD-10-CM

## 2024-04-04 DIAGNOSIS — I10 ESSENTIAL HYPERTENSION, BENIGN: ICD-10-CM

## 2024-04-04 DIAGNOSIS — E78.00 PURE HYPERCHOLESTEROLEMIA: Primary | ICD-10-CM

## 2024-04-04 LAB
ALBUMIN SERPL-MCNC: 4.4 G/DL (ref 3.6–5.1)
ALBUMIN/GLOB SERPL: 1.4 {RATIO} (ref 1–2.5)
ALP SERPL-CCNC: 75 U/L (ref 33–130)
ALT 1742-6: 22 U/L (ref 0–32)
AST 1920-8: 21 U/L (ref 0–35)
BILIRUB SERPL-MCNC: 1 MG/DL (ref 0.2–1.2)
BUN SERPL-MCNC: 17 MG/DL (ref 7–25)
BUN/CREATININE RATIO: 16.8 (ref 6–32)
CALCIUM SERPL-MCNC: 9.4 MG/DL (ref 8.6–10.3)
CHLORIDE SERPLBLD-SCNC: 100.1 MMOL/L (ref 98–110)
CHOLEST SERPL-MCNC: 134 MG/DL (ref 0–199)
CHOLEST/HDLC SERPL: 3 {RATIO} (ref 0–5)
CO2 SERPL-SCNC: 26.8 MMOL/L (ref 20–32)
CREAT SERPL-MCNC: 1.01 MG/DL (ref 0.6–1.3)
GLOBULIN, CALCULATED - QUEST: 3.1 (ref 1.9–3.7)
GLUCOSE SERPL-MCNC: 100 MG/DL (ref 60–99)
HDLC SERPL-MCNC: 46 MG/DL (ref 40–150)
LDLC SERPL CALC-MCNC: 78 MG/DL (ref 0–130)
POTASSIUM SERPL-SCNC: 4.72 MMOL/L (ref 3.5–5.3)
PROT SERPL-MCNC: 7.5 G/DL (ref 6.1–8.1)
SODIUM SERPL-SCNC: 138.6 MMOL/L (ref 135–146)
TRIGL SERPL-MCNC: 49 MG/DL (ref 0–149)

## 2024-04-04 PROCEDURE — 90686 IIV4 VACC NO PRSV 0.5 ML IM: CPT | Performed by: FAMILY MEDICINE

## 2024-04-04 PROCEDURE — 80061 LIPID PANEL: CPT | Performed by: FAMILY MEDICINE

## 2024-04-04 PROCEDURE — 80053 COMPREHEN METABOLIC PANEL: CPT | Performed by: FAMILY MEDICINE

## 2024-04-04 PROCEDURE — 36415 COLL VENOUS BLD VENIPUNCTURE: CPT | Performed by: FAMILY MEDICINE

## 2024-04-04 PROCEDURE — 90471 IMMUNIZATION ADMIN: CPT | Performed by: FAMILY MEDICINE

## 2024-04-04 PROCEDURE — 99214 OFFICE O/P EST MOD 30 MIN: CPT | Mod: 25 | Performed by: FAMILY MEDICINE

## 2024-04-04 PROCEDURE — 84153 ASSAY OF PSA TOTAL: CPT | Mod: 90 | Performed by: FAMILY MEDICINE

## 2024-04-04 PROCEDURE — G2211 COMPLEX E/M VISIT ADD ON: HCPCS | Performed by: FAMILY MEDICINE

## 2024-04-04 RX ORDER — ATORVASTATIN CALCIUM 20 MG/1
20 TABLET, FILM COATED ORAL DAILY
Qty: 90 TABLET | Refills: 1 | Status: SHIPPED | OUTPATIENT
Start: 2024-04-04

## 2024-04-04 RX ORDER — LISINOPRIL AND HYDROCHLOROTHIAZIDE 12.5; 2 MG/1; MG/1
1 TABLET ORAL DAILY
Qty: 90 TABLET | Refills: 1 | Status: SHIPPED | OUTPATIENT
Start: 2024-04-04

## 2024-04-04 RX ORDER — AMLODIPINE BESYLATE 5 MG/1
5 TABLET ORAL DAILY
Qty: 90 TABLET | Refills: 1 | Status: SHIPPED | OUTPATIENT
Start: 2024-04-04

## 2024-04-04 NOTE — PROGRESS NOTES
"  Assessment & Plan     Pure hypercholesterolemia  Control uncertain, continue current medications at current doses pending labs    Essential hypertension, benign  Well controlled, continue current medications at current doses     Special screening for malignant neoplasm of prostate      Need for vaccination        Review of the result(s) of each unique test - labs  Ordering of each unique test  Prescription drug management        BMI  Estimated body mass index is 29.55 kg/m  as calculated from the following:    Height as of this encounter: 1.765 m (5' 9.5\").    Weight as of this encounter: 92.1 kg (203 lb).   Weight management plan: Discussed healthy diet and exercise guidelines      FUTURE APPOINTMENTS:       - Follow-up visit in 6 mo  Work on weight loss  Regular exercise    No follow-ups on file.    Maira Madrigal is a 54 year old, presenting for the following health issues:  Recheck Medication (Pt here for a medication recheck and refill. Is afasting)    HPI       Hyperlipidemia Follow-Up    Are you regularly taking any medication or supplement to lower your cholesterol?   Yes- atorvastatin  Are you having muscle aches or other side effects that you think could be caused by your cholesterol lowering medication?  No    Hypertension Follow-up    Do you check your blood pressure regularly outside of the clinic? Yes   Are you following a low salt diet? No  Are your blood pressures ever more than 140 on the top number (systolic) OR more   than 90 on the bottom number (diastolic), for example 140/90? No  How many servings of fruits and vegetables do you eat daily?  2-3  On average, how many sweetened beverages do you drink each day (Examples: soda, juice, sweet tea, etc.  Do NOT count diet or artificially sweetened beverages)?   1  How many days per week do you exercise enough to make your heart beat faster? 5  How many minutes a day do you exercise enough to make your heart beat faster? 30 - 60  How many days " "per week do you miss taking your medication? 0        Review of Systems  Constitutional, HEENT, cardiovascular, pulmonary, gi and gu systems are negative, except as otherwise noted.      Objective    /76 (BP Location: Right arm, Patient Position: Sitting, Cuff Size: Adult Large)   Pulse 79   Temp 98.1  F (36.7  C) (Temporal)   Ht 1.765 m (5' 9.5\")   Wt 92.1 kg (203 lb)   SpO2 97%   BMI 29.55 kg/m    Body mass index is 29.55 kg/m .  Physical Exam   GENERAL: alert and no distress  EYES: Eyes grossly normal to inspection, PERRL and conjunctivae and sclerae normal  HENT: ear canals and TM's normal, nose and mouth without ulcers or lesions  NECK: no adenopathy, no asymmetry, masses, or scars  RESP: lungs clear to auscultation - no rales, rhonchi or wheezes  CV: regular rate and rhythm, normal S1 S2, no S3 or S4, no murmur, click or rub, no peripheral edema  ABDOMEN: soft, nontender, no hepatosplenomegaly, no masses and bowel sounds normal  MS: no gross musculoskeletal defects noted, no edema  PSYCH: mentation appears normal, affect normal/bright    Office Visit on 07/10/2023   Component Date Value Ref Range Status    Cholesterol 07/10/2023 117  0 - 199 mg/dL Final    Triglycerides 07/10/2023 76  0 - 149 mg/dL Final    HDL Cholesterol 07/10/2023 45  40 - 150 mg/dL Final    LDL Cholesterol Direct 07/10/2023 57  0 - 130 mg/dL Final    Cholesterol/HDL Ratio 07/10/2023 3  0 - 5 Final    Carbon Dioxide 07/10/2023 25.8  20 - 32 mmol/L Final    Creatinine 07/10/2023 1.16  0.60 - 1.30 mg/dL Final    Glucose 07/10/2023 98  60 - 99 mg/dL Final    Sodium 07/10/2023 138.6  135 - 146 mmol/L Final    Potassium 07/10/2023 3.99  3.5 - 5.3 mmol/L Final    Chloride 07/10/2023 101.5  98 - 110 mmol/L Final    Protein Total 07/10/2023 7.4  6.1 - 8.1 g/dL Final    Albumin 07/10/2023 4.5  3.6 - 5.1 g/dL Final    Alkaline Phosphatase 07/10/2023 64  33 - 130 U/L Final    ALT 07/10/2023 17  0 - 32 U/L Final    AST 07/10/2023 19  0 - 35 " U/L Final    Bilirubin Total 07/10/2023 0.9  0.2 - 1.2 mg/dL Final    Urea Nitrogen 07/10/2023 12  7 - 25 mg/dL Final    Calcium 07/10/2023 9.8  8.6 - 10.3 mg/dL Final    BUN/Creatinine Ratio 07/10/2023 10.3  6 - 32 Final    Globulin Calculated 07/10/2023 2.9  1.9 - 3.7 Final    A/G Ratio 07/10/2023 1.6  1 - 2.5 Final           Signed Electronically by: Ari Galo MD

## 2024-04-04 NOTE — NURSING NOTE
Chief Complaint   Patient presents with    Recheck Medication     Pt here for a medication recheck and refill. Is afasting    Pre-visit Screening:  Immunizations:  up to date  Colonoscopy:  is up to date  Mammogram: na  Asthma Action Test/Plan:  na  PHQ9:  PHQ2  GAD7:  na  Questioned patient about current smoking habits Pt. quit smoking some time ago.  Ok to leave detailed message on voice mail for today's visit only yes, phone # [phone]

## 2024-04-05 LAB — ABBOTT PSA - QUEST: 0.46 NG/ML

## 2024-06-01 ENCOUNTER — HEALTH MAINTENANCE LETTER (OUTPATIENT)
Age: 54
End: 2024-06-01

## 2024-06-25 ENCOUNTER — OFFICE VISIT (OUTPATIENT)
Dept: FAMILY MEDICINE | Facility: CLINIC | Age: 54
End: 2024-06-25

## 2024-06-25 VITALS
WEIGHT: 229 LBS | TEMPERATURE: 97.8 F | OXYGEN SATURATION: 96 % | DIASTOLIC BLOOD PRESSURE: 76 MMHG | SYSTOLIC BLOOD PRESSURE: 124 MMHG | BODY MASS INDEX: 33.33 KG/M2 | HEART RATE: 69 BPM

## 2024-06-25 DIAGNOSIS — S61.213A LACERATION OF LEFT MIDDLE FINGER WITHOUT FOREIGN BODY WITHOUT DAMAGE TO NAIL, INITIAL ENCOUNTER: Primary | ICD-10-CM

## 2024-06-25 PROCEDURE — 99213 OFFICE O/P EST LOW 20 MIN: CPT | Performed by: PHYSICIAN ASSISTANT

## 2024-06-25 RX ORDER — CEPHALEXIN 500 MG/1
500 CAPSULE ORAL 4 TIMES DAILY
Qty: 40 CAPSULE | Refills: 0 | Status: SHIPPED | OUTPATIENT
Start: 2024-06-25 | End: 2024-07-29

## 2024-06-25 NOTE — PROGRESS NOTES
Assessment & Plan     Laceration of left middle finger without foreign body without damage to nail, initial encounter-concerns for early abscess given swelling. Called [facility] for urgent consult, no appts today with hand specialist, but pt making appt for tomorrow AM for consult. Will start AB and await consult  Tetanus UTD  Discussed reasons to seek emergency care-fevers/chills, spreading redness in hand, pain with finger flexion/extension  - cephALEXin (KEFLEX) 500 MG capsule  Dispense: 40 capsule; Refill: 0      Follow up with [facility]    No follow-ups on file.    Subjective   Rohan is a 54 year old, presenting for the following health issues:  Hand Pain (Cut middle finger on left hand last week, finger is now red swollen and hard, loss of ROM, back of his hand is now starting to swell ) and Nail Problem (Toenail fungus, has tried many OTC remedies )    HPI     Pt cut L middle finger cleaning evaporator on freezer 6/14/24, Swelled up a little, mostly healed but then developed swelling over MIP joint over last night with some redness. Original cut has healed, but wanted to pop this swelling so took sterile scalpel and cut into this a very small amount-no pus came out, only blood. ROM in finger is limited due to swelling. No fevers/chills. Works for [employer], works with his hands.     No pain in hand currently.     Pt UTD tetanus.      Review of Systems  Constitutional, neuro, ENT, endocrine, pulmonary, cardiac, gastrointestinal, genitourinary, musculoskeletal, integument and psychiatric systems are negative, except as otherwise noted.      Objective    /76 (BP Location: Right arm, Patient Position: Sitting, Cuff Size: Adult Large)   Pulse 69   Temp 97.8  F (36.6  C) (Temporal)   Wt 103.9 kg (229 lb)   SpO2 96%   BMI 33.33 kg/m    Body mass index is 33.33 kg/m .  Physical Exam   GENERAL: alert and no distress  NECK: no adenopathy, no asymmetry, masses, or scars  RESP: lungs clear to  auscultation - no rales, rhonchi or wheezes  CV: regular rate and rhythm, normal S1 S2, no S3 or S4, no murmur, click or rub, no peripheral edema  ABDOMEN: soft, nontender, no hepatosplenomegaly, no masses and bowel sounds normal  MS: no gross musculoskeletal defects noted  SKIN: L 3rd finger, PIP joint: slightly erythematous, swelling noted, well healing cut noted over top of swollen area. No cellulitis noted at this time  NEURO: Normal strength and tone, mentation intact and speech normal            Signed Electronically by: Steve Schaffer PA-C

## 2024-06-25 NOTE — NURSING NOTE
Chief Complaint   Patient presents with    Hand Pain     Cut middle finger on left hand last week, finger is now red swollen and hard, loss of ROM, back of his hand is now starting to swell     Nail Problem     Toenail fungus, has tried many OTC remedies      Pre-visit Screening:  Immunizations:  not up to date- shingrix at pharmacy  Colonoscopy:  is up to date  Mammogram: NA  Asthma Action Test/Plan:  NA  PHQ9:  NA  GAD7:  NA  Questioned patient about current smoking habits Pt. quit smoking some time ago.  Ok to leave detailed message on voice mail for today's visit only Yes, phone # [phone]

## 2024-07-29 ENCOUNTER — OFFICE VISIT (OUTPATIENT)
Dept: FAMILY MEDICINE | Facility: CLINIC | Age: 54
End: 2024-07-29

## 2024-07-29 VITALS
BODY MASS INDEX: 28.38 KG/M2 | WEIGHT: 195 LBS | SYSTOLIC BLOOD PRESSURE: 124 MMHG | RESPIRATION RATE: 18 BRPM | TEMPERATURE: 97.9 F | HEART RATE: 77 BPM | OXYGEN SATURATION: 97 % | DIASTOLIC BLOOD PRESSURE: 80 MMHG

## 2024-07-29 DIAGNOSIS — E78.00 PURE HYPERCHOLESTEROLEMIA: ICD-10-CM

## 2024-07-29 DIAGNOSIS — I10 ESSENTIAL HYPERTENSION, BENIGN: ICD-10-CM

## 2024-07-29 DIAGNOSIS — B35.1 ONYCHOMYCOSIS DUE TO DERMATOPHYTE: Primary | ICD-10-CM

## 2024-07-29 PROCEDURE — 99214 OFFICE O/P EST MOD 30 MIN: CPT | Performed by: STUDENT IN AN ORGANIZED HEALTH CARE EDUCATION/TRAINING PROGRAM

## 2024-07-29 PROCEDURE — G2211 COMPLEX E/M VISIT ADD ON: HCPCS | Performed by: STUDENT IN AN ORGANIZED HEALTH CARE EDUCATION/TRAINING PROGRAM

## 2024-07-29 RX ORDER — FLUCONAZOLE 200 MG/1
200 TABLET ORAL WEEKLY
Qty: 12 TABLET | Refills: 0 | Status: SHIPPED | OUTPATIENT
Start: 2024-07-29

## 2024-07-29 NOTE — PROGRESS NOTES
Assessment & Plan       ICD-10-CM    1. Onychomycosis due to dermatophyte  B35.1 fluconazole (DIFLUCAN) 200 MG tablet      2. Pure hypercholesterolemia  E78.00       3. Essential hypertension, benign  I10          R/b/a of treatment, failure rates, etc reviewed   R/b of fluconazole reviewed, med interactions reviewed -no change to other meds  LFTs at last office visit reviewed and wnl    Patient Instructions   Fluconazole one pill weekly    Follow-up within 12 weeks, check liver numbers at that time     Expect 6-12 months of total treatment     Reasons to follow-up sooner or seek emergent care reviewed.     Rony Huerta MD, [facility]      Subjective     Rohan Ventura is a 54 year old male who presents to clinic today for the following health issues:    HPI   Chief Complaint   Patient presents with    Nail Problem     Has toe fungus on all toenails, has been dealing with this for awhile but wants a treatment option       Toenails yellow, thick - really bothersome to patient  Present for many years  No prior oral tx   No alcohol use  Feels well overall    Current Outpatient Medications   Medication Sig Dispense Refill    amLODIPine (NORVASC) 5 MG tablet Take 1 tablet (5 mg) by mouth daily 90 tablet 1    atorvastatin (LIPITOR) 20 MG tablet Take 1 tablet (20 mg) by mouth daily 90 tablet 1    lisinopril-hydrochlorothiazide (ZESTORETIC) 20-12.5 MG tablet Take 1 tablet by mouth daily 90 tablet 1    multivitamin, therapeutic with minerals (MULTI-VITAMIN) TABS Take 1 tablet by mouth daily       Social History     Tobacco Use    Smoking status: Former     Current packs/day: 0.00     Types: Cigarettes     Quit date: 1994     Years since quittin.5     Passive exposure: Never    Smokeless tobacco: Never   Substance Use Topics    Alcohol use: Not Currently    Drug use: No            Objective    /80 (BP Location: Right arm, Patient Position: Sitting, Cuff Size: Adult Large)   Pulse  77   Temp 97.9  F (36.6  C) (Temporal)   Resp 18   Wt 88.5 kg (195 lb)   SpO2 97%   BMI 28.38 kg/m    Body mass index is 28.38 kg/m .  Alert, NAD  NC/AT  Sclerae anicteric  Regular  Resp nonlabored  Skin warm and dry, onychomycosis with yellow thickening and subungual debris all toenails. No tinea pedis present  No focal neuro deficits. Speech intact. Normal gait.  Appropriate affect         Labs reviewed.  Office Visit on 04/04/2024   Component Date Value Ref Range Status    Cholesterol 04/04/2024 134  0 - 199 mg/dL Final    Triglycerides 04/04/2024 49  0 - 149 mg/dL Final    HDL Cholesterol 04/04/2024 46  40 - 150 mg/dL Final    LDL Cholesterol Direct 04/04/2024 78  0 - 130 mg/dL Final    Cholesterol/HDL Ratio 04/04/2024 3  0 - 5 Final    Carbon Dioxide 04/04/2024 26.8  20 - 32 mmol/L Final    Creatinine 04/04/2024 1.01  0.60 - 1.30 mg/dL Final    Glucose 04/04/2024 100 (A)  60 - 99 mg/dL Final    Sodium 04/04/2024 138.6  135 - 146 mmol/L Final    Potassium 04/04/2024 4.72  3.5 - 5.3 mmol/L Final    Chloride 04/04/2024 100.1  98 - 110 mmol/L Final    Protein Total 04/04/2024 7.5  6.1 - 8.1 g/dL Final    Albumin 04/04/2024 4.4  3.6 - 5.1 g/dL Final    Alkaline Phosphatase 04/04/2024 75  33 - 130 U/L Final    ALT 04/04/2024 22  0 - 32 U/L Final    AST 04/04/2024 21  0 - 35 U/L Final    Bilirubin Total 04/04/2024 1.0  0.2 - 1.2 mg/dL Final    Urea Nitrogen 04/04/2024 17  7 - 25 mg/dL Final    Calcium 04/04/2024 9.4  8.6 - 10.3 mg/dL Final    BUN/Creatinine Ratio 04/04/2024 16.8  6 - 32 Final    Globulin Calculated 04/04/2024 3.1  1.9 - 3.7 Final    A/G Ratio 04/04/2024 1.4  1 - 2.5 Final    Abbott PSA 04/04/2024 0.46  < OR = 4.00 ng/mL Final    Comment: The total PSA value from this assay system is   standardized against the WHO standard. The test   result will be approximately 20% lower when compared   to the equimolar-standardized total PSA ([place]   [place]). Comparison of serial PSA results should be    interpreted with this fact in mind.     This test was performed using the Siemens   chemiluminescent method. Values obtained from   different assay methods cannot be used  interchangeably. PSA levels, regardless of  value, should not be interpreted as absolute  evidence of the presence or absence of disease.

## 2024-07-29 NOTE — PATIENT INSTRUCTIONS
Fluconazole one pill weekly    Follow-up within 12 weeks, check liver numbers at that time     Expect 6-12 months of total treatment

## 2024-07-29 NOTE — NURSING NOTE
Chief Complaint   Patient presents with    Nail Problem     Has toe fungus on all toenails, has been dealing with this for awhile but wants a treatment option      Pre-visit Screening:  Immunizations:  up to date  Colonoscopy:  is up to date  Mammogram: na  Asthma Action Test/Plan:  na  PHQ9:  na  GAD7:  na  Questioned patient about current smoking habits Pt. quit smoking some time ago.  Ok to leave detailed message on voice mail for today's visit only yes, phone # [phone] (home) [phone] (work)

## 2024-08-10 DIAGNOSIS — I10 ESSENTIAL HYPERTENSION, BENIGN: ICD-10-CM

## 2024-08-10 DIAGNOSIS — E78.00 PURE HYPERCHOLESTEROLEMIA: ICD-10-CM

## 2024-08-13 RX ORDER — AMLODIPINE BESYLATE 5 MG/1
5 TABLET ORAL DAILY
COMMUNITY
Start: 2024-08-13

## 2024-08-13 RX ORDER — LISINOPRIL AND HYDROCHLOROTHIAZIDE 12.5; 2 MG/1; MG/1
1 TABLET ORAL DAILY
COMMUNITY
Start: 2024-08-13

## 2024-08-13 RX ORDER — ATORVASTATIN CALCIUM 20 MG/1
20 TABLET, FILM COATED ORAL DAILY
COMMUNITY
Start: 2024-08-13

## 2024-08-13 NOTE — TELEPHONE ENCOUNTER
Rohan Ventura is requesting a refill of:    Refused Prescriptions:                       Disp   Refills    lisinopril-hydrochlorothiazide (ZESTORETIC*                Sig: TAKE 1 TABLET BY MOUTH EVERY DAY  Refused By: DARLENE FISH  Reason for Refusal: Patient has requested refill too soon    atorvastatin (LIPITOR) 20 MG tablet [Pharm*                Sig: TAKE 1 TABLET BY MOUTH EVERY DAY  Refused By: DARLENE FISH  Reason for Refusal: Patient has requested refill too soon    amLODIPine (NORVASC) 5 MG tablet [Pharmacy*                Sig: TAKE 1 TABLET BY MOUTH EVERY DAY  Refused By: DARLENE FISH  Reason for Refusal: Patient has requested refill too soon    Refills sent on 04/04/24 for 90 with 1 refill, enough medication until 10/2024

## 2024-10-14 ENCOUNTER — OFFICE VISIT (OUTPATIENT)
Dept: FAMILY MEDICINE | Facility: CLINIC | Age: 54
End: 2024-10-14

## 2024-10-14 VITALS
BODY MASS INDEX: 29.14 KG/M2 | WEIGHT: 200.2 LBS | TEMPERATURE: 97.5 F | HEART RATE: 71 BPM | OXYGEN SATURATION: 98 % | DIASTOLIC BLOOD PRESSURE: 70 MMHG | SYSTOLIC BLOOD PRESSURE: 110 MMHG

## 2024-10-14 DIAGNOSIS — E78.00 PURE HYPERCHOLESTEROLEMIA: ICD-10-CM

## 2024-10-14 DIAGNOSIS — Z23 NEED FOR VACCINATION: ICD-10-CM

## 2024-10-14 DIAGNOSIS — B35.1 ONYCHOMYCOSIS DUE TO DERMATOPHYTE: Primary | ICD-10-CM

## 2024-10-14 DIAGNOSIS — I10 ESSENTIAL HYPERTENSION, BENIGN: ICD-10-CM

## 2024-10-14 PROCEDURE — 80053 COMPREHEN METABOLIC PANEL: CPT | Performed by: STUDENT IN AN ORGANIZED HEALTH CARE EDUCATION/TRAINING PROGRAM

## 2024-10-14 PROCEDURE — 99214 OFFICE O/P EST MOD 30 MIN: CPT | Mod: 25 | Performed by: STUDENT IN AN ORGANIZED HEALTH CARE EDUCATION/TRAINING PROGRAM

## 2024-10-14 PROCEDURE — 36415 COLL VENOUS BLD VENIPUNCTURE: CPT | Performed by: STUDENT IN AN ORGANIZED HEALTH CARE EDUCATION/TRAINING PROGRAM

## 2024-10-14 PROCEDURE — 90471 IMMUNIZATION ADMIN: CPT | Performed by: STUDENT IN AN ORGANIZED HEALTH CARE EDUCATION/TRAINING PROGRAM

## 2024-10-14 PROCEDURE — 90656 IIV3 VACC NO PRSV 0.5 ML IM: CPT | Performed by: STUDENT IN AN ORGANIZED HEALTH CARE EDUCATION/TRAINING PROGRAM

## 2024-10-14 RX ORDER — AMLODIPINE BESYLATE 5 MG/1
5 TABLET ORAL DAILY
Qty: 90 TABLET | Refills: 1 | Status: SHIPPED | OUTPATIENT
Start: 2024-10-14

## 2024-10-14 RX ORDER — LISINOPRIL AND HYDROCHLOROTHIAZIDE 12.5; 2 MG/1; MG/1
1 TABLET ORAL DAILY
Qty: 90 TABLET | Refills: 1 | Status: SHIPPED | OUTPATIENT
Start: 2024-10-14

## 2024-10-14 RX ORDER — ATORVASTATIN CALCIUM 20 MG/1
20 TABLET, FILM COATED ORAL DAILY
Qty: 90 TABLET | Refills: 1 | Status: CANCELLED | OUTPATIENT
Start: 2024-10-14

## 2024-10-14 RX ORDER — ATORVASTATIN CALCIUM 20 MG/1
20 TABLET, FILM COATED ORAL DAILY
Qty: 90 TABLET | Refills: 1 | Status: SHIPPED | OUTPATIENT
Start: 2024-10-14

## 2024-10-14 RX ORDER — AMLODIPINE BESYLATE 5 MG/1
5 TABLET ORAL DAILY
Qty: 90 TABLET | Refills: 1 | Status: CANCELLED | OUTPATIENT
Start: 2024-10-14

## 2024-10-14 RX ORDER — FLUCONAZOLE 200 MG/1
200 TABLET ORAL WEEKLY
Qty: 12 TABLET | Refills: 1 | Status: SHIPPED | OUTPATIENT
Start: 2024-10-14

## 2024-10-14 RX ORDER — LISINOPRIL AND HYDROCHLOROTHIAZIDE 12.5; 2 MG/1; MG/1
1 TABLET ORAL DAILY
Qty: 90 TABLET | Refills: 1 | Status: CANCELLED | OUTPATIENT
Start: 2024-10-14

## 2024-10-14 NOTE — PATIENT INSTRUCTIONS
Fluconazole weekly     No change to medications     Follow-up in April for fasting annual physical, sooner if concerns

## 2024-10-14 NOTE — NURSING NOTE
Chief Complaint   Patient presents with    Recheck Medication     Non-fasting recheck for fluconazole, doesn't seem to be working.      Pre-visit Screening:  Immunizations:  not up to date - pt declined  Colonoscopy:  up to date  Mammogram:   Asthma Action Test/Plan:    PHQ9:    GAD7:    Questioned patient about current smoking habits Pt. Quit long time ago  Ok to leave detailed message on voice mail for today's visit only yes, phone # [phone]

## 2024-10-14 NOTE — PROGRESS NOTES
Assessment & Plan     1. Essential hypertension, benign  Well controlled, labs today, continue current doses   - amLODIPine (NORVASC) 5 MG tablet; Take 1 tablet (5 mg) by mouth daily.  Dispense: 90 tablet; Refill: 1  - lisinopril-hydrochlorothiazide (ZESTORETIC) 20-12.5 MG tablet; Take 1 tablet by mouth daily.  Dispense: 90 tablet; Refill: 1    2. Pure hypercholesterolemia  Stable, continue current medication   - atorvastatin (LIPITOR) 20 MG tablet; Take 1 tablet (20 mg) by mouth daily.  Dispense: 90 tablet; Refill: 1    3. Onychomycosis due to dermatophyte  Reinforced dosing frequency, recheck LFTs today  - fluconazole (DIFLUCAN) 200 MG tablet; Take 1 tablet (200 mg) by mouth once a week.  Dispense: 12 tablet; Refill: 1    4. Need for vaccination  Flu shot today     Patient Instructions   Fluconazole weekly     No change to medications     Follow-up in April for fasting annual physical, sooner if concerns      Reasons to follow-up sooner or seek emergent care reviewed.     Rony Huerta MD, [facility]      Subjective     Rohan Ventura is a 54 year old male who presents to clinic today for the following health issues:    HPI   Chief Complaint   Patient presents with    Recheck Medication     Non-fasting recheck for fluconazole, doesn't seem to be working.       Onychomycosis follow-up: Taking fluconazole monthly instead of weekly. No side effects. No improvement in nails.     Hyperlipidemia Follow-Up  Are you regularly taking any medication or supplement to lower your cholesterol?   Yes- statin  Are you having muscle aches or other side effects that you think could be caused by your cholesterol lowering medication?  No    Hypertension Follow-up    Do you check your blood pressure regularly outside of the clinic? Yes   Are you following a low salt diet? Yes  Are your blood pressures ever more than 140 on the top number (systolic) OR more   than 90 on the bottom number (diastolic), for  example 140/90? No, home readings excellent          Objective    /70 (BP Location: Right arm, Patient Position: Sitting, Cuff Size: Adult Large)   Pulse 71   Temp 97.5  F (36.4  C) (Temporal)   Wt 90.8 kg (200 lb 3.2 oz)   SpO2 98%   BMI 29.14 kg/m    Body mass index is 29.14 kg/m .  Alert, NAD  NC/AT  Sclerae anicteric  RRR  Resp nonlabored  Skin warm and dry  No focal neuro deficits. Speech intact. Normal gait.  Appropriate affect       Labs reviewed.

## 2024-10-15 LAB
ALBUMIN SERPL-MCNC: 4.5 G/DL (ref 3.6–5.1)
ALP SERPL-CCNC: 59 U/L (ref 33–130)
ALT 1742-6: 25 U/L (ref 0–32)
AST 1920-8: 4 U/L (ref 0–35)
BILIRUB SERPL-MCNC: 0.8 MG/DL (ref 0.2–1.2)
BUN SERPL-MCNC: 15 MG/DL (ref 7–25)
BUN/CREATININE RATIO: 17 (ref 6–32)
CALCIUM SERPL-MCNC: 8.8 MG/DL (ref 8.6–10.3)
CHLORIDE SERPLBLD-SCNC: 101.9 MMOL/L (ref 98–110)
CO2 SERPL-SCNC: 26.9 MMOL/L (ref 20–32)
CREAT SERPL-MCNC: 0.86 MG/DL (ref 0.6–1.3)
GLUCOSE SERPL-MCNC: 82 MG/DL (ref 60–99)
POTASSIUM SERPL-SCNC: 4.14 MMOL/L (ref 3.5–5.3)
PROT SERPL-MCNC: 7.2 G/DL (ref 6.1–8.1)
SODIUM SERPL-SCNC: 138 MMOL/L (ref 135–146)

## 2025-04-30 ENCOUNTER — OFFICE VISIT (OUTPATIENT)
Dept: FAMILY MEDICINE | Facility: CLINIC | Age: 55
End: 2025-04-30

## 2025-04-30 VITALS
WEIGHT: 220 LBS | SYSTOLIC BLOOD PRESSURE: 112 MMHG | DIASTOLIC BLOOD PRESSURE: 82 MMHG | OXYGEN SATURATION: 97 % | BODY MASS INDEX: 32.02 KG/M2 | HEART RATE: 71 BPM

## 2025-04-30 DIAGNOSIS — I10 ESSENTIAL HYPERTENSION, BENIGN: ICD-10-CM

## 2025-04-30 DIAGNOSIS — Z12.5 SPECIAL SCREENING FOR MALIGNANT NEOPLASM OF PROSTATE: ICD-10-CM

## 2025-04-30 DIAGNOSIS — E78.00 PURE HYPERCHOLESTEROLEMIA: ICD-10-CM

## 2025-04-30 DIAGNOSIS — H93.8X2 EAR FULLNESS, LEFT: Primary | ICD-10-CM

## 2025-04-30 PROCEDURE — 84153 ASSAY OF PSA TOTAL: CPT | Mod: 90 | Performed by: FAMILY MEDICINE

## 2025-04-30 PROCEDURE — G2211 COMPLEX E/M VISIT ADD ON: HCPCS | Performed by: FAMILY MEDICINE

## 2025-04-30 PROCEDURE — 36415 COLL VENOUS BLD VENIPUNCTURE: CPT | Performed by: FAMILY MEDICINE

## 2025-04-30 PROCEDURE — 80053 COMPREHEN METABOLIC PANEL: CPT | Performed by: FAMILY MEDICINE

## 2025-04-30 PROCEDURE — 80061 LIPID PANEL: CPT | Performed by: FAMILY MEDICINE

## 2025-04-30 PROCEDURE — 99214 OFFICE O/P EST MOD 30 MIN: CPT | Performed by: FAMILY MEDICINE

## 2025-04-30 RX ORDER — NEOMYCIN SULFATE, POLYMYXIN B SULFATE AND HYDROCORTISONE 10; 3.5; 1 MG/ML; MG/ML; [USP'U]/ML
3 SUSPENSION/ DROPS AURICULAR (OTIC) 3 TIMES DAILY
Qty: 5 ML | Refills: 0 | Status: SHIPPED | OUTPATIENT
Start: 2025-04-30

## 2025-04-30 NOTE — PROGRESS NOTES
SUBJECTIVE: Rohan Ventura is a 55 year old male who presents for left ear fullness, slight clear discharge on and off for a couple weeks . Pt had similar issue last year-saw ENT- put on drops that did resolve infection. It feels like hearing off a bit.     NO fevers , no URI symptoms     Also due for med check    Patient Active Problem List   Diagnosis    Obesity    Labyrinthitis    Essential hypertension, benign    Pure hypercholesterolemia    ACP (advance care planning)    Abnormal glucose       Past Medical History:   Diagnosis Date    Panic disorder without agoraphobia        Family History   Problem Relation Age of Onset    C.A.D. Father          age 58    Hypertension Father     Hypertension Mother     Diabetes Mother     Lipids Mother     Osteoporosis Mother          age 80 after hip fracture    Hypertension Maternal Grandfather     Gastrointestinal Disease Sister         Crohns    Gastrointestinal Disease Sister         Crohns       Social History     Socioeconomic History    Marital status:      Spouse name: Not on file    Number of children: Not on file    Years of education: Not on file    Highest education level: Not on file   Occupational History    Occupation: repair   Tobacco Use    Smoking status: Former     Current packs/day: 0.00     Types: Cigarettes     Quit date: 1994     Years since quittin.3     Passive exposure: Never    Smokeless tobacco: Never   Substance and Sexual Activity    Alcohol use: Not Currently    Drug use: No    Sexual activity: Yes   Other Topics Concern     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, missle , infantry     Blood Transfusions No    Caffeine Concern No    Occupational Exposure No    Hobby Hazards No    Sleep Concern No    Stress Concern No    Weight Concern No    Special Diet No    Back Care No    Exercise No    Bike Helmet No    Seat Belt Yes    Self-Exams Not Asked   Social History Narrative    Not on file     Social Drivers of  Health     Financial Resource Strain: Not on file   Food Insecurity: Not on file   Transportation Needs: Not on file   Physical Activity: Not on file   Stress: Not on file   Social Connections: Not on file   Interpersonal Safety: Not on file   Housing Stability: Not on file       Past Surgical History:   Procedure Laterality Date    VASECTOMY  2025    [facility] NONSPECIFIC PROCEDURE  01/01/1997    LUNG ABSCESS SURGERY-empyema ; dr Mendes       Current Outpatient Medications   Medication Sig Dispense Refill    amLODIPine (NORVASC) 5 MG tablet Take 1 tablet (5 mg) by mouth daily. 90 tablet 1    atorvastatin (LIPITOR) 20 MG tablet Take 1 tablet (20 mg) by mouth daily. 90 tablet 1    fluconazole (DIFLUCAN) 200 MG tablet Take 1 tablet (200 mg) by mouth once a week. 12 tablet 1    lisinopril-hydrochlorothiazide (ZESTORETIC) 20-12.5 MG tablet Take 1 tablet by mouth daily. 90 tablet 1    multivitamin, therapeutic with minerals (MULTI-VITAMIN) TABS Take 1 tablet by mouth daily       No current facility-administered medications for this visit.        Allergies: Patient has no known allergies.      Immunization History   Administered Date(s) Administered    COVID-19 MONOVALENT 12+ (Pfizer) 04/14/2021, 05/04/2021, 11/11/2021    Hepatitis A (VAQTA)(ADULT 19+) 01/18/2019    Influenza (IIV3) PF 09/16/2010, 09/24/2011, 09/01/2012, 09/09/2013, 09/16/2014    Influenza Vaccine >6 months,quad, PF 09/11/2013, 09/04/2014, 02/08/2016, 01/18/2019, 11/30/2020, 11/18/2021, 01/12/2023, 04/04/2024    Influenza Vaccine, 6+MO IM (QUADRIVALENT W/PRESERVATIVES) 10/11/2017, 10/16/2019    Influenza, Split Virus, Trivalent, Pf (Fluzone\Fluarix) 10/14/2024    TD,PF 7+ (Tenivac) 04/29/2003, 09/08/2022    TDAP Vaccine (Boostrix) 09/01/2012          Assessment & Plan     Ear fullness, left  Possible very early OE- pt has had similar issues in past requiring abx drops, will treat but also discussed some of his symptoms may be eustachian tube dysfunction-may  take a few weeks to settle down, f/u if not improving or worsening    - neomycin-polymyxin-hydrocortisone (CORTISPORIN) 3.5-09353-9 otic suspension  Dispense: 5 mL; Refill: 0    Pure hypercholesterolemia  Control uncertain, continue current medications at current doses pending labs  - Lipid Panel ([facility])  - Comprehensive Metobolic Panel ([facility])  - VENOUS COLLECTION    Essential hypertension, benign  Well controlled, continue current medications at current doses   - Comprehensive Metobolic Panel ([facility])  - VENOUS COLLECTION    Special screening for malignant neoplasm of prostate  - PSA Total ([facility])  - VENOUS COLLECTION              Follow-up 6 mo      Maira Madrigal is a 55 year old, presenting for the following health issues:  Ear Problem (Feels fullness in his left ear, no pain currently, first noticed the fullness a couple of weeks ago, last time he felt this he was diagnosed with an ear infection and got ear drops which took the fullness away)    HPI        Hyperlipidemia Follow-Up    Are you regularly taking any medication or supplement to lower your cholesterol?   Yes- atorvastatin  Are you having muscle aches or other side effects that you think could be caused by your cholesterol lowering medication?  No    Hypertension Follow-up    Do you check your blood pressure regularly outside of the clinic? Yes   Are you following a low salt diet? No  Are your blood pressures ever more than 140 on the top number (systolic) OR more   than 90 on the bottom number (diastolic), for example 140/90? No    BP Readings from Last 2 Encounters:   04/30/25 112/82   10/14/24 110/70     How many servings of fruits and vegetables do you eat daily?  2-3  On average, how many sweetened beverages do you drink each day (Examples: soda, juice, sweet tea, etc.  Do NOT count diet or artificially sweetened beverages)?   1  How many days per week do you exercise enough to make your heart beat faster? 5  How many minutes a day do you  exercise enough to make your heart beat faster? 20 - 29  How many days per week do you miss taking your medication? 0        Review of Systems  Constitutional, HEENT, cardiovascular, pulmonary, gi and gu systems are negative, except as otherwise noted.      Objective    /82 (BP Location: Left arm, Patient Position: Sitting, Cuff Size: Adult Large)   Pulse 71   Wt 99.8 kg (220 lb)   SpO2 97%   BMI 32.02 kg/m    Body mass index is 32.02 kg/m .  Physical Exam   GENERAL: alert and no distress  EYES: Eyes grossly normal to inspection, PERRL and conjunctivae and sclerae normal  HENT: normal cephalic/atraumatic, left ear: there is scant yellow white matter, appears moist, no blood, nose and mouth without ulcers or lesions, oropharynx clear, and oral mucous membranes moist  NECK: no adenopathy, no asymmetry, masses, or scars  RESP: lungs clear to auscultation - no rales, rhonchi or wheezes  CV: regular rate and rhythm, normal S1 S2, no S3 or S4, no murmur, click or rub, no peripheral edema  ABDOMEN: soft, nontender, no hepatosplenomegaly, no masses and bowel sounds normal    Office Visit on 10/14/2024   Component Date Value Ref Range Status    Carbon Dioxide 10/14/2024 26.9  20 - 32 mmol/L Final    Creatinine 10/14/2024 0.86  0.60 - 1.30 mg/dL Final    Glucose 10/14/2024 82  60 - 99 mg/dL Final    Sodium 10/14/2024 138.0  135 - 146 mmol/L Final    Potassium 10/14/2024 4.14  3.5 - 5.3 mmol/L Final    Chloride 10/14/2024 101.9  98 - 110 mmol/L Final    Protein Total 10/14/2024 7.2  6.1 - 8.1 g/dL Final    Albumin 10/14/2024 4.5  3.6 - 5.1 g/dL Final    Alkaline Phosphatase 10/14/2024 59  33 - 130 U/L Final    ALT 10/14/2024 25  0 - 32 U/L Final    AST 10/14/2024 4  0 - 35 U/L Final    Bilirubin Total 10/14/2024 0.8  0.2 - 1.2 mg/dL Final    Urea Nitrogen 10/14/2024 15  7 - 25 mg/dL Final    Calcium 10/14/2024 8.8  8.6 - 10.3 mg/dL Final    BUN/Creatinine Ratio 10/14/2024 17  6 - 32 Final           Signed  Electronically by: Ari Galo MD

## 2025-04-30 NOTE — NURSING NOTE
Chief Complaint   Patient presents with    Ear Problem     Feels fullness in his left ear, no pain currently, first noticed the fullness a couple of weeks ago, last time he felt this he was diagnosed with an ear infection and got ear drops which took the fullness away     Pre-visit Screening:  Immunizations:  not up to date - due for prevnar-has to go to pharmacy for others that are due   Colonoscopy:  is up to date  Mammogram: porfirio  Asthma Action Test/Plan:  porfirio  PHQ9:  na  GAD7:  na  Questioned patient about current smoking habits Pt. quit smoking some time ago.  Ok to leave detailed message on voice mail for today's visit only yes, phone # [phone] (home) [phone] (work)

## 2025-05-01 LAB
ABBOTT PSA - QUEST: 0.74 NG/ML
ALBUMIN SERPL-MCNC: 4.7 G/DL (ref 3.6–5.1)
ALP SERPL-CCNC: 72 U/L (ref 33–130)
ALT 1742-6: 22 U/L (ref 0–32)
AST 1920-8: 23 U/L (ref 0–35)
BILIRUB SERPL-MCNC: 0.8 MG/DL (ref 0.2–1.2)
BUN SERPL-MCNC: 14 MG/DL (ref 7–25)
BUN/CREATININE RATIO: 13 (ref 6–32)
CALCIUM SERPL-MCNC: 9.8 MG/DL (ref 8.6–10.3)
CHLORIDE SERPLBLD-SCNC: 105.4 MMOL/L (ref 98–110)
CHOLEST SERPL-MCNC: 135 MG/DL (ref 0–199)
CHOLEST/HDLC SERPL: 3 {RATIO} (ref 0–5)
CO2 SERPL-SCNC: 26.7 MMOL/L (ref 20–32)
CREAT SERPL-MCNC: 1.07 MG/DL (ref 0.6–1.3)
GLUCOSE SERPL-MCNC: 88 MG/DL (ref 60–99)
HDLC SERPL-MCNC: 43 MG/DL (ref 40–150)
LDLC SERPL CALC-MCNC: 77 MG/DL (ref 0–129)
POTASSIUM SERPL-SCNC: 4.87 MMOL/L (ref 3.5–5.3)
PROT SERPL-MCNC: 7.9 G/DL (ref 6.1–8.1)
SODIUM SERPL-SCNC: 136.7 MMOL/L (ref 135–146)
TRIGL SERPL-MCNC: 76 MG/DL (ref 0–149)

## 2025-06-14 ENCOUNTER — HEALTH MAINTENANCE LETTER (OUTPATIENT)
Age: 55
End: 2025-06-14

## 2025-06-23 DIAGNOSIS — B35.1 ONYCHOMYCOSIS DUE TO DERMATOPHYTE: ICD-10-CM

## 2025-06-23 RX ORDER — FLUCONAZOLE 200 MG/1
200 TABLET ORAL WEEKLY
Qty: 12 TABLET | Refills: 1 | Status: SHIPPED | OUTPATIENT
Start: 2025-06-23

## 2025-06-23 NOTE — TELEPHONE ENCOUNTER
Pt requesting refill of fluconazole for toe nail fungus, last prescribed 10/14/25. Was seen 4/30/25 for med check.     Pending Prescriptions:                       Disp   Refills    fluconazole (DIFLUCAN) 200 MG tablet      12 tab*1            Sig: Take 1 tablet (200 mg) by mouth once a week.    Routing to Dr. Galo for review.

## 2025-07-10 DIAGNOSIS — I10 ESSENTIAL HYPERTENSION, BENIGN: ICD-10-CM

## 2025-07-10 RX ORDER — LISINOPRIL AND HYDROCHLOROTHIAZIDE 12.5; 2 MG/1; MG/1
1 TABLET ORAL DAILY
Qty: 90 TABLET | Refills: 0 | Status: SHIPPED | OUTPATIENT
Start: 2025-07-10

## 2025-07-10 RX ORDER — AMLODIPINE BESYLATE 5 MG/1
5 TABLET ORAL DAILY
Qty: 90 TABLET | Refills: 0 | Status: SHIPPED | OUTPATIENT
Start: 2025-07-10

## 2025-07-10 NOTE — TELEPHONE ENCOUNTER
Pt last seen 04/30/25 advised to return in 6 months. Can you send for [facility]?    Rohan Ventura is requesting a refill of:    Pending Prescriptions:                       Disp   Refills    lisinopril-hydrochlorothiazide (ZESTORETI*90 tab*0            Sig: TAKE 1 TABLET BY MOUTH EVERY DAY    amLODIPine (NORVASC) 5 MG tablet [Pharmac*90 tab*0            Sig: TAKE 1 TABLET BY MOUTH EVERY DAY
